# Patient Record
Sex: FEMALE | Race: WHITE | NOT HISPANIC OR LATINO | Employment: FULL TIME | URBAN - METROPOLITAN AREA
[De-identification: names, ages, dates, MRNs, and addresses within clinical notes are randomized per-mention and may not be internally consistent; named-entity substitution may affect disease eponyms.]

---

## 2017-04-28 ENCOUNTER — HOSPITAL ENCOUNTER (EMERGENCY)
Facility: HOSPITAL | Age: 37
Discharge: HOME | End: 2017-04-28
Admitting: EMERGENCY MEDICINE
Payer: COMMERCIAL

## 2017-04-28 ENCOUNTER — APPOINTMENT (EMERGENCY)
Dept: RADIOLOGY | Facility: HOSPITAL | Age: 37
End: 2017-04-28
Payer: COMMERCIAL

## 2017-04-28 VITALS
RESPIRATION RATE: 18 BRPM | SYSTOLIC BLOOD PRESSURE: 146 MMHG | OXYGEN SATURATION: 98 % | HEIGHT: 64 IN | BODY MASS INDEX: 42.34 KG/M2 | DIASTOLIC BLOOD PRESSURE: 83 MMHG | WEIGHT: 248 LBS | HEART RATE: 91 BPM

## 2017-04-28 DIAGNOSIS — S93.401A RIGHT ANKLE SPRAIN: Primary | ICD-10-CM

## 2017-04-28 PROCEDURE — 73610 X-RAY EXAM OF ANKLE: CPT

## 2017-04-28 PROCEDURE — 99283 EMERGENCY DEPT VISIT LOW MDM: CPT

## 2017-04-28 PROCEDURE — 73590 X-RAY EXAM OF LOWER LEG: CPT

## 2017-05-05 ENCOUNTER — ALLSCRIPTS OFFICE VISIT (OUTPATIENT)
Dept: OTHER | Facility: OTHER | Age: 37
End: 2017-05-05

## 2017-05-05 ENCOUNTER — TRANSCRIBE ORDERS (OUTPATIENT)
Dept: LAB | Facility: HOSPITAL | Age: 37
End: 2017-05-05

## 2017-05-05 ENCOUNTER — APPOINTMENT (OUTPATIENT)
Dept: LAB | Facility: HOSPITAL | Age: 37
End: 2017-05-05
Attending: INTERNAL MEDICINE
Payer: COMMERCIAL

## 2017-05-05 DIAGNOSIS — R53.83 FATIGUE, UNSPECIFIED TYPE: Primary | ICD-10-CM

## 2017-05-05 DIAGNOSIS — R53.83 FATIGUE, UNSPECIFIED TYPE: ICD-10-CM

## 2017-05-05 LAB
ALBUMIN SERPL BCP-MCNC: 3.8 G/DL (ref 3.5–5)
ALP SERPL-CCNC: 93 U/L (ref 46–116)
ALT SERPL W P-5'-P-CCNC: 40 U/L (ref 12–78)
ANION GAP SERPL CALCULATED.3IONS-SCNC: 4 MMOL/L (ref 4–13)
AST SERPL W P-5'-P-CCNC: 21 U/L (ref 5–45)
BASOPHILS # BLD AUTO: 0.05 THOUSANDS/ΜL (ref 0–0.1)
BASOPHILS NFR BLD AUTO: 0 % (ref 0–1)
BILIRUB SERPL-MCNC: 0.27 MG/DL (ref 0.2–1)
BUN SERPL-MCNC: 11 MG/DL (ref 5–25)
CALCIUM SERPL-MCNC: 9 MG/DL (ref 8.3–10.1)
CHLORIDE SERPL-SCNC: 104 MMOL/L (ref 100–108)
CHOLEST SERPL-MCNC: 211 MG/DL (ref 50–200)
CO2 SERPL-SCNC: 30 MMOL/L (ref 21–32)
CREAT SERPL-MCNC: 0.81 MG/DL (ref 0.6–1.3)
EOSINOPHIL # BLD AUTO: 0.31 THOUSAND/ΜL (ref 0–0.61)
EOSINOPHIL NFR BLD AUTO: 2 % (ref 0–6)
ERYTHROCYTE [DISTWIDTH] IN BLOOD BY AUTOMATED COUNT: 13.3 % (ref 11.6–15.1)
GFR SERPL CREATININE-BSD FRML MDRD: >60 ML/MIN/1.73SQ M
GLUCOSE P FAST SERPL-MCNC: 135 MG/DL (ref 65–99)
HCT VFR BLD AUTO: 42 % (ref 34.8–46.1)
HDLC SERPL-MCNC: 40 MG/DL (ref 40–60)
HGB BLD-MCNC: 14.8 G/DL (ref 11.5–15.4)
LDLC SERPL CALC-MCNC: 140 MG/DL (ref 0–100)
LYMPHOCYTES # BLD AUTO: 4.86 THOUSANDS/ΜL (ref 0.6–4.47)
LYMPHOCYTES NFR BLD AUTO: 34 % (ref 14–44)
MCH RBC QN AUTO: 32.2 PG (ref 26.8–34.3)
MCHC RBC AUTO-ENTMCNC: 35.2 G/DL (ref 31.4–37.4)
MCV RBC AUTO: 92 FL (ref 82–98)
MONOCYTES # BLD AUTO: 0.8 THOUSAND/ΜL (ref 0.17–1.22)
MONOCYTES NFR BLD AUTO: 6 % (ref 4–12)
NEUTROPHILS # BLD AUTO: 8.29 THOUSANDS/ΜL (ref 1.85–7.62)
NEUTS SEG NFR BLD AUTO: 58 % (ref 43–75)
NRBC BLD AUTO-RTO: 0 /100 WBCS
PLATELET # BLD AUTO: 198 THOUSANDS/UL (ref 149–390)
PMV BLD AUTO: 12.5 FL (ref 8.9–12.7)
POTASSIUM SERPL-SCNC: 4.4 MMOL/L (ref 3.5–5.3)
PROT SERPL-MCNC: 7.2 G/DL (ref 6.4–8.2)
RBC # BLD AUTO: 4.59 MILLION/UL (ref 3.81–5.12)
SODIUM SERPL-SCNC: 138 MMOL/L (ref 136–145)
TRIGL SERPL-MCNC: 155 MG/DL
TSH SERPL DL<=0.05 MIU/L-ACNC: 1.53 UIU/ML (ref 0.36–3.74)
WBC # BLD AUTO: 14.35 THOUSAND/UL (ref 4.31–10.16)

## 2017-05-05 PROCEDURE — 84443 ASSAY THYROID STIM HORMONE: CPT

## 2017-05-05 PROCEDURE — 80053 COMPREHEN METABOLIC PANEL: CPT

## 2017-05-05 PROCEDURE — 85025 COMPLETE CBC W/AUTO DIFF WBC: CPT

## 2017-05-05 PROCEDURE — 36415 COLL VENOUS BLD VENIPUNCTURE: CPT

## 2017-05-05 PROCEDURE — 80061 LIPID PANEL: CPT

## 2017-11-11 ENCOUNTER — HOSPITAL ENCOUNTER (EMERGENCY)
Facility: HOSPITAL | Age: 37
Discharge: HOME | End: 2017-11-11
Attending: EMERGENCY MEDICINE | Admitting: EMERGENCY MEDICINE
Payer: COMMERCIAL

## 2017-11-11 ENCOUNTER — APPOINTMENT (EMERGENCY)
Dept: RADIOLOGY | Facility: HOSPITAL | Age: 37
End: 2017-11-11
Payer: COMMERCIAL

## 2017-11-11 VITALS
RESPIRATION RATE: 18 BRPM | DIASTOLIC BLOOD PRESSURE: 81 MMHG | WEIGHT: 250 LBS | SYSTOLIC BLOOD PRESSURE: 142 MMHG | HEIGHT: 64 IN | TEMPERATURE: 97.7 F | HEART RATE: 74 BPM | OXYGEN SATURATION: 98 % | BODY MASS INDEX: 42.68 KG/M2

## 2017-11-11 DIAGNOSIS — R07.81 PLEURITIC CHEST PAIN: Primary | ICD-10-CM

## 2017-11-11 DIAGNOSIS — R05.9 COUGH: ICD-10-CM

## 2017-11-11 LAB
ALBUMIN SERPL BCP-MCNC: 3.5 G/DL (ref 3.5–5)
ALP SERPL-CCNC: 91 U/L (ref 46–116)
ALT SERPL W P-5'-P-CCNC: 46 U/L (ref 12–78)
ANION GAP SERPL CALCULATED.3IONS-SCNC: 8 MMOL/L (ref 4–13)
AST SERPL W P-5'-P-CCNC: 28 U/L (ref 5–45)
BASOPHILS # BLD AUTO: 0.03 THOUSANDS/ΜL (ref 0–0.1)
BASOPHILS NFR BLD AUTO: 0 % (ref 0–1)
BILIRUB SERPL-MCNC: 0.22 MG/DL (ref 0.2–1)
BILIRUB UR QL STRIP: NEGATIVE
BUN SERPL-MCNC: 10 MG/DL (ref 5–25)
CALCIUM SERPL-MCNC: 9 MG/DL (ref 8.3–10.1)
CHLORIDE SERPL-SCNC: 104 MMOL/L (ref 100–108)
CLARITY UR: CLEAR
CO2 SERPL-SCNC: 25 MMOL/L (ref 21–32)
COLOR UR: YELLOW
CREAT SERPL-MCNC: 0.8 MG/DL (ref 0.6–1.3)
DEPRECATED D DIMER PPP: 266 NG/ML (FEU) (ref 0–424)
EOSINOPHIL # BLD AUTO: 0.29 THOUSAND/ΜL (ref 0–0.61)
EOSINOPHIL NFR BLD AUTO: 2 % (ref 0–6)
ERYTHROCYTE [DISTWIDTH] IN BLOOD BY AUTOMATED COUNT: 13.3 % (ref 11.6–15.1)
EXT PREG TEST URINE: NORMAL
FLUAV AG SPEC QL: NORMAL
FLUBV AG SPEC QL: NORMAL
GFR SERPL CREATININE-BSD FRML MDRD: 95 ML/MIN/1.73SQ M
GLUCOSE SERPL-MCNC: 145 MG/DL (ref 65–140)
GLUCOSE UR STRIP-MCNC: NEGATIVE MG/DL
HCT VFR BLD AUTO: 39.9 % (ref 34.8–46.1)
HGB BLD-MCNC: 14.2 G/DL (ref 11.5–15.4)
HGB UR QL STRIP.AUTO: NEGATIVE
KETONES UR STRIP-MCNC: NEGATIVE MG/DL
LEUKOCYTE ESTERASE UR QL STRIP: NEGATIVE
LIPASE SERPL-CCNC: 153 U/L (ref 73–393)
LYMPHOCYTES # BLD AUTO: 4.67 THOUSANDS/ΜL (ref 0.6–4.47)
LYMPHOCYTES NFR BLD AUTO: 32 % (ref 14–44)
MCH RBC QN AUTO: 32.4 PG (ref 26.8–34.3)
MCHC RBC AUTO-ENTMCNC: 35.6 G/DL (ref 31.4–37.4)
MCV RBC AUTO: 91 FL (ref 82–98)
MONOCYTES # BLD AUTO: 0.6 THOUSAND/ΜL (ref 0.17–1.22)
MONOCYTES NFR BLD AUTO: 4 % (ref 4–12)
NEUTROPHILS # BLD AUTO: 8.9 THOUSANDS/ΜL (ref 1.85–7.62)
NEUTS SEG NFR BLD AUTO: 62 % (ref 43–75)
NITRITE UR QL STRIP: NEGATIVE
NRBC BLD AUTO-RTO: 0 /100 WBCS
PH UR STRIP.AUTO: 6 [PH] (ref 4.5–8)
PLATELET # BLD AUTO: 182 THOUSANDS/UL (ref 149–390)
PMV BLD AUTO: 12.7 FL (ref 8.9–12.7)
POTASSIUM SERPL-SCNC: 3.5 MMOL/L (ref 3.5–5.3)
PROT SERPL-MCNC: 7.1 G/DL (ref 6.4–8.2)
PROT UR STRIP-MCNC: NEGATIVE MG/DL
RBC # BLD AUTO: 4.38 MILLION/UL (ref 3.81–5.12)
RSV B RNA SPEC QL NAA+PROBE: NORMAL
SODIUM SERPL-SCNC: 137 MMOL/L (ref 136–145)
SP GR UR STRIP.AUTO: >=1.03 (ref 1–1.03)
UROBILINOGEN UR QL STRIP.AUTO: 0.2 E.U./DL
WBC # BLD AUTO: 14.53 THOUSAND/UL (ref 4.31–10.16)

## 2017-11-11 PROCEDURE — 87798 DETECT AGENT NOS DNA AMP: CPT | Performed by: EMERGENCY MEDICINE

## 2017-11-11 PROCEDURE — 81025 URINE PREGNANCY TEST: CPT | Performed by: EMERGENCY MEDICINE

## 2017-11-11 PROCEDURE — 93005 ELECTROCARDIOGRAM TRACING: CPT | Performed by: EMERGENCY MEDICINE

## 2017-11-11 PROCEDURE — 85025 COMPLETE CBC W/AUTO DIFF WBC: CPT | Performed by: EMERGENCY MEDICINE

## 2017-11-11 PROCEDURE — 36415 COLL VENOUS BLD VENIPUNCTURE: CPT | Performed by: EMERGENCY MEDICINE

## 2017-11-11 PROCEDURE — 83690 ASSAY OF LIPASE: CPT | Performed by: EMERGENCY MEDICINE

## 2017-11-11 PROCEDURE — 99284 EMERGENCY DEPT VISIT MOD MDM: CPT

## 2017-11-11 PROCEDURE — 85379 FIBRIN DEGRADATION QUANT: CPT | Performed by: EMERGENCY MEDICINE

## 2017-11-11 PROCEDURE — 81003 URINALYSIS AUTO W/O SCOPE: CPT

## 2017-11-11 PROCEDURE — 80053 COMPREHEN METABOLIC PANEL: CPT | Performed by: EMERGENCY MEDICINE

## 2017-11-11 PROCEDURE — 71020 HB CHEST X-RAY 2VW FRONTAL&LATL: CPT

## 2017-11-11 PROCEDURE — 81002 URINALYSIS NONAUTO W/O SCOPE: CPT | Performed by: EMERGENCY MEDICINE

## 2017-11-11 NOTE — DISCHARGE INSTRUCTIONS
Cold Symptoms, Ambulatory Care   GENERAL INFORMATION:   Cold symptoms  include sneezing, dry throat, a stuffy nose, headache, watery eyes, and a cough  Your cough may be dry, or you may cough up mucus  You may also have muscle aches, joint pain, and tiredness  Rarely, you may have a fever  Cold symptoms occur from inflammation in your upper respiratory system caused by a virus  Most colds go away without treatment  Seek immediate care for the following symptoms:   · A heartbeat that is much faster than usual for you     · A swollen neck that is sore to the touch     · Increased tiredness and weakness    · Pinpoint or larger reddish-purple dots on your skin     · Poor or no appetite  Treatment for cold symptoms  may include NSAIDS to decrease muscle aches and fever  Do not give NSAID medicines to children under 10months of age without direction from your child's doctor  Cold medicines may also be given to decrease coughing, nasal stuffiness, sneezing, and a runny nose  Do not give cold medicines to children under 11years of age without direction from your child's doctor  Manage your cold symptoms with the following:   · Drink liquids  to help thin and loosen thick mucus so you can cough it up  Liquids will also keep you hydrated  Ask your healthcare provider which liquids are best for you and how much to drink each day  · Do not smoke  because it may worsen your symptoms and increase the length of time you feel sick  Talk with your healthcare provider if you need help to stop smoking  Prevent the spread of germs  by washing your hands often  You can spread your cold germs to others for at least 3 days after your symptoms start  Do not share items, such as eating utensils  Cover your nose and mouth when you cough or sneeze using the crook of your elbow instead of your hands  Throw used tissues in the garbage    Follow up with your healthcare provider as directed:  Write down your questions so you remember to ask them during your visits  CARE AGREEMENT:   You have the right to help plan your care  Learn about your health condition and how it may be treated  Discuss treatment options with your caregivers to decide what care you want to receive  You always have the right to refuse treatment  The above information is an  only  It is not intended as medical advice for individual conditions or treatments  Talk to your doctor, nurse or pharmacist before following any medical regimen to see if it is safe and effective for you  © 2014 [address] is for End User's use only and may not be sold, redistributed or otherwise used for commercial purposes  All illustrations and images included in CareNotes® are the copyrighted property of A [employer]  or Rahat Orozco

## 2017-11-11 NOTE — ED PROVIDER NOTES
History  Chief Complaint   Patient presents with    Abdominal Pain     Pt c/o lower right quadrant abdominal pain that gets worse with breathing  Pt states she has flu like symptoms for about 1 month  Patient is a 39 yo woman with a pmh of asthma, who presents for evaluation of right lower rib pain  The pain started 1 month ago and has gradually worsened in severity  It is located along her right lower ribs  It is constant today  It is worsened with breathing, coughing and is relieved with rest  She has not taken any medications for the symptoms  She also complains of a cough  The cough has been present for 4 days  She believes that she might have been exposed to sick individuals at work  The cough is non-productive  She also complains of irregular colored stools of the month  Her stools alternate between green, light brown and loose  She denies abdominal pain, cramping, nausea, vomiting  Denies SOB, Ha, fever, chills  She has not had a flu shot this year  Patient smokes 1 pack of cigarettes per day  History provided by:  Patient   used: No        None       Past Medical History:   Diagnosis Date    Asthma     GERD (gastroesophageal reflux disease)     Kidney stones     Kidney stones     Psychiatric disorder     anxiety, depression, PTSD       Past Surgical History:   Procedure Laterality Date    TONSILECTOMY AND ADNOIDECTOMY      TONSILECTOMY AND ADNOIDECTOMY      WISDOM TOOTH EXTRACTION         History reviewed  No pertinent family history  I have reviewed and agree with the history as documented  Social History   Substance Use Topics    Smoking status: Current Every Day Smoker     Packs/day: 1 00    Smokeless tobacco: Never Used    Alcohol use Yes      Comment: social        Review of Systems   Constitutional: Negative for appetite change, chills, diaphoresis, fatigue and fever  HENT: Negative for congestion, sore throat and trouble swallowing      Eyes: Negative for photophobia and visual disturbance  Respiratory: Positive for cough (non-productive)  Negative for choking, chest tightness, shortness of breath and wheezing  Cardiovascular: Positive for chest pain  Negative for palpitations and leg swelling  Gastrointestinal: Positive for diarrhea  Negative for abdominal distention, abdominal pain, constipation, nausea and vomiting  Endocrine: Negative for polydipsia and polyuria  Genitourinary: Negative for difficulty urinating, dysuria, hematuria, vaginal bleeding, vaginal discharge and vaginal pain  Musculoskeletal: Negative for arthralgias, back pain, neck pain and neck stiffness  Skin: Negative for rash and wound  Neurological: Negative for dizziness, light-headedness and headaches  Psychiatric/Behavioral: Negative for behavioral problems  The patient is not nervous/anxious  Physical Exam  ED Triage Vitals   Temperature Pulse Respirations Blood Pressure SpO2   11/10/17 2349 11/10/17 2347 11/10/17 2347 11/10/17 2347 11/10/17 2347   97 7 °F (36 5 °C) 82 18 154/94 100 %      Temp Source Heart Rate Source Patient Position - Orthostatic VS BP Location FiO2 (%)   11/10/17 2349 11/11/17 0107 11/11/17 0107 11/11/17 0107 --   Oral Monitor Lying Right arm       Pain Score       11/10/17 2347       5           Orthostatic Vital Signs  Vitals:    11/10/17 2347 11/11/17 0107   BP: 154/94 142/81   Pulse: 82 74   Patient Position - Orthostatic VS:  Lying       Physical Exam   Constitutional: She is oriented to person, place, and time  She appears well-developed and well-nourished  No distress  HENT:   Head: Normocephalic and atraumatic  Eyes: Conjunctivae are normal  Pupils are equal, round, and reactive to light  No scleral icterus  Neck: Normal range of motion  Neck supple  Cardiovascular: Normal rate, regular rhythm and normal heart sounds  Exam reveals no friction rub  No murmur heard    Pulmonary/Chest: Effort normal and breath sounds normal  No respiratory distress  She has no wheezes  She exhibits tenderness (pain with deep inspiration  )  Abdominal: Soft  Bowel sounds are normal  She exhibits no distension and no mass  There is no tenderness  There is no rebound and no guarding  Musculoskeletal: Normal range of motion  She exhibits no edema  Neurological: She is alert and oriented to person, place, and time  Skin: Skin is warm and dry  No rash noted  She is not diaphoretic  No erythema  No pallor  Psychiatric: She has a normal mood and affect  Her behavior is normal    Nursing note and vitals reviewed  ED Medications  Medications - No data to display    Diagnostic Studies  Results Reviewed     Procedure Component Value Units Date/Time    Influenza A/B and RSV by PCR (indicated for patients >2 mo of age) [[id number]]  (Normal) Collected:  11/11/17 0100    Lab Status:  Final result Specimen:  Nasopharyngeal from Nasopharyngeal Swab Updated:  11/11/17 1334     INFLU A PCR None Detected     INFLU B PCR None Detected     RSV PCR None Detected    Comprehensive metabolic panel [[id number]]  (Abnormal) Collected:  11/11/17 0054    Lab Status:  Final result Specimen:  Blood from Arm, Right Updated:  11/11/17 0122     Sodium 137 mmol/L      Potassium 3 5 mmol/L      Chloride 104 mmol/L      CO2 25 mmol/L      Anion Gap 8 mmol/L      BUN 10 mg/dL      Creatinine 0 80 mg/dL      Glucose 145 (H) mg/dL      Calcium 9 0 mg/dL      AST 28 U/L      ALT 46 U/L      Alkaline Phosphatase 91 U/L      Total Protein 7 1 g/dL      Albumin 3 5 g/dL      Total Bilirubin 0 22 mg/dL      eGFR 95 ml/min/1 73sq m     Narrative:         National Kidney Disease Education Program recommendations are as follows:  GFR calculation is accurate only with a steady state creatinine  Chronic Kidney disease less than 60 ml/min/1 73 sq  meters  Kidney failure less than 15 ml/min/1 73 sq  meters      Lipase [[id number]]  (Normal) Collected:  11/11/17 0054    Lab Status:  Final result Specimen:  Blood from Arm, Right Updated:  11/11/17 0122     Lipase 153 u/L     D-Dimer [[id number]]  (Normal) Collected:  11/11/17 0054    Lab Status:  Final result Specimen:  Blood from Arm, Right Updated:  11/11/17 0116     D-Dimer, Quant 266 ng/ml (FEU)     POCT pregnancy, urine [[id number]]  (Normal) Resulted:  11/11/17 0106    Lab Status:  Final result Updated:  11/11/17 0106     EXT PREG TEST UR (Ref: Negative) hcg neg    POCT urinalysis dipstick [[id number]]  (Normal) Resulted:  11/11/17 0105    Lab Status:  Final result Specimen:  Urine Updated:  11/11/17 0106    ED Urine Macroscopic [[id number]]  (Normal) Collected:  11/11/17 0212    Lab Status:  Final result Specimen:  Urine Updated:  11/11/17 0105     Color, UA Yellow     Clarity, UA Clear     pH, UA 6 0     Leukocytes, UA Negative     Nitrite, UA Negative     Protein, UA Negative mg/dl      Glucose, UA Negative mg/dl      Ketones, UA Negative mg/dl      Urobilinogen, UA 0 2 E U /dl      Bilirubin, UA Negative     Blood, UA Negative     Specific Gravity, UA >=1 030    Narrative:       CLINITEK RESULT    CBC and differential [[id number]]  (Abnormal) Collected:  11/11/17 0054    Lab Status:  Final result Specimen:  Blood from Arm, Right Updated:  11/11/17 0102     WBC 14 53 (H) Thousand/uL      RBC 4 38 Million/uL      Hemoglobin 14 2 g/dL      Hematocrit 39 9 %      MCV 91 fL      MCH 32 4 pg      MCHC 35 6 g/dL      RDW 13 3 %      MPV 12 7 fL      Platelets 652 Thousands/uL      nRBC 0 /100 WBCs      Neutrophils Relative 62 %      Lymphocytes Relative 32 %      Monocytes Relative 4 %      Eosinophils Relative 2 %      Basophils Relative 0 %      Neutrophils Absolute 8 90 (H) Thousands/µL      Lymphocytes Absolute 4 67 (H) Thousands/µL      Monocytes Absolute 0 60 Thousand/µL      Eosinophils Absolute 0 29 Thousand/µL      Basophils Absolute 0 03 Thousands/µL                  XR chest 2 views   Final Result by Dima Smith MD (11/11 1888)      No active pulmonary disease  Workstation performed: [id number]               Procedures  ECG 12 Lead Documentation  Date/Time: 11/11/2017 1:31 AM  Performed by: Jaya Sher  Authorized by: Vern Dietrich     Indications / Diagnosis:  Pleuritic chest pain  ECG reviewed by me, the ED Provider: yes    Patient location:  ED  Previous ECG:     Previous ECG:  Unavailable    Comparison to cardiac monitor: Yes    Interpretation:     Interpretation: non-specific    Rate:     ECG rate:  70    ECG rate assessment: normal    Rhythm:     Rhythm: sinus rhythm    Ectopy:     Ectopy: none    QRS:     QRS axis:  Normal  Conduction:     Conduction: normal    ST segments:     ST segments:  Normal  T waves:     T waves: normal    Comments:      Possible right guillermo branch block            Phone Consults  ED Phone Contact    ED Course  ED Course as of Nov 12 1743   Sat Nov 11, 2017   0134 D-DIMER QUANTITATIVE: 266   0135 WBC: (!) 14 53                         Wells' Criteria for PE    Flowsheet Row Most Recent Value   Wells' Criteria for PE   Clinical signs and symptoms of DVT  0 Filed at: 11/11/2017 0753   PE is primary diagnosis or equally likely  0 Filed at: 11/11/2017 0049   HR >100  0 Filed at: 11/11/2017 0049   Immobilization at least 3 days or Surgery in the previous 4 weeks  0 Filed at: 11/11/2017 0049   Previous, objectively diagnosed PE or DVT  0 Filed at: 11/11/2017 0049   Hemoptysis  0 Filed at: 11/11/2017 0049   Malignancy with treatment within 6 months or palliative  0 Filed at: 11/11/2017 0049   Wells' Criteria Total  0 Filed at: 11/11/2017 0049            MDM  Number of Diagnoses or Management Options  Cough: new and requires workup  Pleuritic chest pain: new and requires workup  Diagnosis management comments: 41 yo woman with cough and pleuritic chest pain  Ddx includes Pulmonary embolism, effusion, pleuritis, pneumonia  CXR normal, D-dimmer 266, CMP, CBC, UA, EKG all wnl  Flu swab obtained   Patient will be called if results positive  Presentation likely musculoskeletal irritation from chronic cough 2/2 smoking  Patient was counseled on pain control at home with motrin and tylenol alternating  Patient also counseled on the risk of smoking and benefits of cessation  Return precautions discussed  Amount and/or Complexity of Data Reviewed  Clinical lab tests: ordered and reviewed  Tests in the radiology section of CPT®: ordered and reviewed  Tests in the medicine section of CPT®: ordered and reviewed  Decide to obtain previous medical records or to obtain history from someone other than the patient: yes  Obtain history from someone other than the patient: yes  Review and summarize past medical records: yes  Discuss the patient with other providers: yes  Independent visualization of images, tracings, or specimens: yes    Risk of Complications, Morbidity, and/or Mortality  Presenting problems: high  Diagnostic procedures: low  Management options: minimal      CritCare Time    Disposition  Final diagnoses:   Pleuritic chest pain   Cough     Time reflects when diagnosis was documented in both MDM as applicable and the Disposition within this note     Time User Action Codes Description Comment    11/11/2017  1:44 AM Laura Penrose Add [R07 81] Pleuritic chest pain     11/11/2017  1:44 AM Laura Penrose Add [R05] Cough       ED Disposition     ED Disposition Condition Comment    Discharge  Dena Garcia discharge to home/self care  Condition at discharge: Stable        Follow-up Information     Follow up With Specialties Details Why Contact Info Additional [address] Emergency Department Emergency Medicine Go to As needed, If symptoms worsen [address] [place] [address], [address] [place] [address], [id number]        There are no discharge medications for this patient  No discharge procedures on file      ED Provider  Attending physically available and evaluated Refugio Garcia I managed the patient along with the ED Attending      Electronically Signed by         Dalia Roberts MD  Resident  11/12/17 6969

## 2017-11-11 NOTE — ED ATTENDING ATTESTATION
Prabhu Lund MD, saw and evaluated the patient  I have discussed the patient with the resident/non-physician practitioner and agree with the resident's/non-physician practitioner's findings, Plan of Care, and MDM as documented in the resident's/non-physician practitioner's note, except where noted  All available labs and Radiology studies were reviewed  At this point I agree with the current assessment done in the Emergency Department  I have conducted an independent evaluation of this patient including a focused history of:    Emergency Department Note- Vee Maloney 40 y o  female MRN: [id number]    Unit/Bed#: ED 18 Encounter: [id number]    Vee Maloney is a 40 y o  female who presents with   Chief Complaint   Patient presents with    Abdominal Pain     Pt c/o lower right quadrant abdominal pain that gets worse with breathing  Pt states she has flu like symptoms for about 1 month  History of Present Illness   HPI:  Vee Maloney is a 40 y o  female who presents for evaluation of:  Right sided pleuritic pain  Pain has been intermittent for days  Patient has also had intermittent nausea  Patient has no h/o VTE       Review of Systems    Historical Information   Past Medical History:   Diagnosis Date    Asthma     GERD (gastroesophageal reflux disease)     Kidney stones     Kidney stones     Psychiatric disorder     anxiety, depression, PTSD     Past Surgical History:   Procedure Laterality Date    TONSILECTOMY AND ADNOIDECTOMY      TONSILECTOMY AND ADNOIDECTOMY      WISDOM TOOTH EXTRACTION       Social History   History   Alcohol Use    Yes     Comment: social     History   Drug Use No     History   Smoking Status    Current Every Day Smoker    Packs/day: 1 00   Smokeless Tobacco    Never Used     Family History: non-contributory    Meds/Allergies   all medications and allergies reviewed  Allergies   Allergen Reactions    Bee Venom Anaphylaxis    Penicillins Hives       Objective   First Vitals:   Blood Pressure: 154/94 (11/10/17 2347)  Pulse: 82 (11/10/17 2347)  Temperature: 97 7 °F (36 5 °C) (11/10/17 2349)  Temp Source: Oral (11/10/17 2349)  Respirations: 18 (11/10/17 2347)  Height: 5' 4" (162 6 cm) (11/10/17 2347)  Weight - Scale: 113 kg (250 lb) (11/10/17 2347)  SpO2: 100 % (11/10/17 2347)    Current Vitals:   Blood Pressure: 142/81 (17)  Pulse: 74 (17)  Temperature: 97 7 °F (36 5 °C) (11/10/17 2349)  Temp Source: Oral (11/10/17 2349)  Respirations: 18 (17)  Height: 5' 4" (162 6 cm) (11/10/17 2347)  Weight - Scale: 113 kg (250 lb) (11/10/17 2347)  SpO2: 98 % (17)    No intake or output data in the 24 hours ending 17 1447    Invasive Devices     Peripheral Intravenous Line            Peripheral IV 17 Right Antecubital less than 1 day                Physical Exam   Constitutional: She is oriented to person, place, and time  She appears well-developed and well-nourished  HENT:   Head: Normocephalic and atraumatic  Eyes: Conjunctivae are normal  Pupils are equal, round, and reactive to light  Neck: Normal range of motion  Neck supple  Cardiovascular: Normal rate and regular rhythm  Pulmonary/Chest: Effort normal and breath sounds normal    Neurological: She is alert and oriented to person, place, and time  Skin: Skin is warm and dry  Psychiatric: She has a normal mood and affect  Her behavior is normal  Judgment and thought content normal    Nursing note and vitals reviewed  Medical Decision Makin  Acute right sided pleuritic pain:  Plan to obtain D-dimer to rule out pulmonary embolism; chest x-ray to rule out pneumonia and pneumothorax  ECG to rule out pericarditis      Recent Results (from the past 36 hour(s))   Comprehensive metabolic panel    Collection Time: 17 12:54 AM   Result Value Ref Range    Sodium 137 136 - 145 mmol/L    Potassium 3 5 3 5 - 5 3 mmol/L    Chloride 104 100 - 108 mmol/L    CO2 25 21 - 32 mmol/L    Anion Gap 8 4 - 13 mmol/L    BUN 10 5 - 25 mg/dL    Creatinine 0 80 0 60 - 1 30 mg/dL    Glucose 145 (H) 65 - 140 mg/dL    Calcium 9 0 8 3 - 10 1 mg/dL    AST 28 5 - 45 U/L    ALT 46 12 - 78 U/L    Alkaline Phosphatase 91 46 - 116 U/L    Total Protein 7 1 6 4 - 8 2 g/dL    Albumin 3 5 3 5 - 5 0 g/dL    Total Bilirubin 0 22 0 20 - 1 00 mg/dL    eGFR 95 ml/min/1 73sq m   CBC and differential    Collection Time: 11/11/17 12:54 AM   Result Value Ref Range    WBC 14 53 (H) 4 31 - 10 16 Thousand/uL    RBC 4 38 3 81 - 5 12 Million/uL    Hemoglobin 14 2 11 5 - 15 4 g/dL    Hematocrit 39 9 34 8 - 46 1 %    MCV 91 82 - 98 fL    MCH 32 4 26 8 - 34 3 pg    MCHC 35 6 31 4 - 37 4 g/dL    RDW 13 3 11 6 - 15 1 %    MPV 12 7 8 9 - 12 7 fL    Platelets 041 996 - 398 Thousands/uL    nRBC 0 /100 WBCs    Neutrophils Relative 62 43 - 75 %    Lymphocytes Relative 32 14 - 44 %    Monocytes Relative 4 4 - 12 %    Eosinophils Relative 2 0 - 6 %    Basophils Relative 0 0 - 1 %    Neutrophils Absolute 8 90 (H) 1 85 - 7 62 Thousands/µL    Lymphocytes Absolute 4 67 (H) 0 60 - 4 47 Thousands/µL    Monocytes Absolute 0 60 0 17 - 1 22 Thousand/µL    Eosinophils Absolute 0 29 0 00 - 0 61 Thousand/µL    Basophils Absolute 0 03 0 00 - 0 10 Thousands/µL   Lipase    Collection Time: 11/11/17 12:54 AM   Result Value Ref Range    Lipase 153 73 - 393 u/L   D-Dimer    Collection Time: 11/11/17 12:54 AM   Result Value Ref Range    D-Dimer, Quant 266 0 - 424 ng/ml (FEU)   Influenza A/B and RSV by PCR (indicated for patients >2 mo of age)    Collection Time: 11/11/17  1:00 AM   Result Value Ref Range    INFLU A PCR None Detected None Detected    INFLU B PCR None Detected None Detected    RSV PCR None Detected None Detected   POCT pregnancy, urine    Collection Time: 11/11/17  1:06 AM   Result Value Ref Range    EXT PREG TEST UR (Ref: Negative) hcg neg    ED Urine Macroscopic    Collection Time: 11/11/17  2:12 AM Result Value Ref Range    Color, UA Yellow     Clarity, UA Clear     pH, UA 6 0 4 5 - 8 0    Leukocytes, UA Negative Negative    Nitrite, UA Negative Negative    Protein, UA Negative Negative mg/dl    Glucose, UA Negative Negative mg/dl    Ketones, UA Negative Negative mg/dl    Urobilinogen, UA 0 2 0 2, 1 0 E U /dl E U /dl    Bilirubin, UA Negative Negative    Blood, UA Negative Negative    Specific Gravity, UA >=1 030 1 003 - 1 030     XR chest 2 views   Final Result      No active pulmonary disease  Workstation performed: [id number]               Portions of the record may have been created with voice recognition software  Occasional wrong word or "sound a like" substitutions may have occurred due to the inherent limitations of voice recognition software  Read the chart carefully and recognize, using context, where substitutions have occurred

## 2017-11-13 LAB
ATRIAL RATE: 70 BPM
P AXIS: 26 DEGREES
PR INTERVAL: 124 MS
QRS AXIS: 42 DEGREES
QRSD INTERVAL: 96 MS
QT INTERVAL: 404 MS
QTC INTERVAL: 436 MS
T WAVE AXIS: 14 DEGREES
VENTRICULAR RATE: 70 BPM

## 2018-01-05 ENCOUNTER — TRANSCRIBE ORDERS (OUTPATIENT)
Dept: LAB | Facility: HOSPITAL | Age: 38
End: 2018-01-05

## 2018-01-05 ENCOUNTER — APPOINTMENT (OUTPATIENT)
Dept: LAB | Facility: HOSPITAL | Age: 38
End: 2018-01-05
Payer: COMMERCIAL

## 2018-01-05 DIAGNOSIS — N97.9 PRIMARY FEMALE INFERTILITY: ICD-10-CM

## 2018-01-05 DIAGNOSIS — N97.9 PRIMARY FEMALE INFERTILITY: Primary | ICD-10-CM

## 2018-01-05 LAB
ESTRADIOL SERPL-MCNC: 23 PG/ML
FSH SERPL-ACNC: 4 MIU/ML
LH SERPL-ACNC: 2.9 MIU/ML
TSH SERPL DL<=0.05 MIU/L-ACNC: 1.11 UIU/ML (ref 0.36–3.74)

## 2018-01-05 PROCEDURE — 36415 COLL VENOUS BLD VENIPUNCTURE: CPT

## 2018-01-05 PROCEDURE — 84443 ASSAY THYROID STIM HORMONE: CPT

## 2018-01-05 PROCEDURE — 83001 ASSAY OF GONADOTROPIN (FSH): CPT

## 2018-01-05 PROCEDURE — 82670 ASSAY OF TOTAL ESTRADIOL: CPT

## 2018-01-05 PROCEDURE — 83002 ASSAY OF GONADOTROPIN (LH): CPT

## 2018-01-05 PROCEDURE — 83516 IMMUNOASSAY NONANTIBODY: CPT | Performed by: OBSTETRICS & GYNECOLOGY

## 2018-01-09 LAB — MIS SERPL-MCNC: 1.43 NG/ML

## 2018-01-12 VITALS
HEART RATE: 86 BPM | HEIGHT: 64 IN | SYSTOLIC BLOOD PRESSURE: 126 MMHG | BODY MASS INDEX: 43.45 KG/M2 | DIASTOLIC BLOOD PRESSURE: 86 MMHG | WEIGHT: 254.5 LBS

## 2018-01-30 ENCOUNTER — APPOINTMENT (OUTPATIENT)
Dept: LAB | Facility: HOSPITAL | Age: 38
End: 2018-01-30
Payer: COMMERCIAL

## 2018-01-30 ENCOUNTER — TRANSCRIBE ORDERS (OUTPATIENT)
Dept: LAB | Facility: HOSPITAL | Age: 38
End: 2018-01-30

## 2018-01-30 DIAGNOSIS — Z13.29 SCREENING FOR THYROID DISORDER: ICD-10-CM

## 2018-01-30 DIAGNOSIS — N97.9 PRIMARY FEMALE INFERTILITY: Primary | ICD-10-CM

## 2018-01-30 LAB
ESTRADIOL SERPL-MCNC: 27 PG/ML
FSH SERPL-ACNC: 4.4 MIU/ML
LH SERPL-ACNC: 3 MIU/ML
PROGEST SERPL-MCNC: 0.6 NG/ML

## 2018-01-30 PROCEDURE — 83001 ASSAY OF GONADOTROPIN (FSH): CPT

## 2018-01-30 PROCEDURE — 36415 COLL VENOUS BLD VENIPUNCTURE: CPT

## 2018-01-30 PROCEDURE — 82670 ASSAY OF TOTAL ESTRADIOL: CPT

## 2018-01-30 PROCEDURE — 83002 ASSAY OF GONADOTROPIN (LH): CPT

## 2018-01-30 PROCEDURE — 84144 ASSAY OF PROGESTERONE: CPT

## 2018-02-17 ENCOUNTER — APPOINTMENT (OUTPATIENT)
Dept: LAB | Facility: HOSPITAL | Age: 38
End: 2018-02-17
Payer: COMMERCIAL

## 2018-02-17 DIAGNOSIS — N97.9 PRIMARY FEMALE INFERTILITY: ICD-10-CM

## 2018-02-17 DIAGNOSIS — Z13.29 SCREENING FOR THYROID DISORDER: ICD-10-CM

## 2018-02-17 LAB
ESTRADIOL SERPL-MCNC: 159 PG/ML
PROGEST SERPL-MCNC: 0.9 NG/ML
TSH SERPL DL<=0.05 MIU/L-ACNC: 1.71 UIU/ML (ref 0.36–3.74)

## 2018-02-17 PROCEDURE — 84443 ASSAY THYROID STIM HORMONE: CPT

## 2018-02-17 PROCEDURE — 82670 ASSAY OF TOTAL ESTRADIOL: CPT

## 2018-02-17 PROCEDURE — 84144 ASSAY OF PROGESTERONE: CPT

## 2018-02-17 PROCEDURE — 36415 COLL VENOUS BLD VENIPUNCTURE: CPT

## 2018-02-17 PROCEDURE — 83516 IMMUNOASSAY NONANTIBODY: CPT

## 2018-02-21 LAB — MIS SERPL-MCNC: 1.56 NG/ML

## 2020-11-12 ENCOUNTER — TELEPHONE (OUTPATIENT)
Dept: BARIATRICS | Facility: CLINIC | Age: 40
End: 2020-11-12

## 2020-11-17 ENCOUNTER — CLINICAL SUPPORT (OUTPATIENT)
Dept: BARIATRICS | Facility: CLINIC | Age: 40
End: 2020-11-17

## 2020-11-17 VITALS
WEIGHT: 233 LBS | SYSTOLIC BLOOD PRESSURE: 118 MMHG | TEMPERATURE: 97.5 F | DIASTOLIC BLOOD PRESSURE: 70 MMHG | HEIGHT: 65 IN | HEART RATE: 71 BPM | BODY MASS INDEX: 38.82 KG/M2

## 2020-11-17 DIAGNOSIS — E66.9 OBESITY, UNSPECIFIED CLASSIFICATION, UNSPECIFIED OBESITY TYPE, UNSPECIFIED WHETHER SERIOUS COMORBIDITY PRESENT: Primary | ICD-10-CM

## 2020-11-17 DIAGNOSIS — E11.69 DIABETES MELLITUS TYPE 2 IN OBESE (HCC): ICD-10-CM

## 2020-11-17 DIAGNOSIS — E66.9 OBESITY, CLASS II, BMI 35-39.9: Primary | ICD-10-CM

## 2020-11-17 DIAGNOSIS — E66.9 DIABETES MELLITUS TYPE 2 IN OBESE (HCC): ICD-10-CM

## 2020-11-17 PROCEDURE — RECHECK

## 2020-11-17 RX ORDER — SITAGLIPTIN AND METFORMIN HYDROCHLORIDE 1000; 50 MG/1; MG/1
TABLET, FILM COATED ORAL
COMMUNITY
Start: 2020-11-14

## 2020-11-18 ENCOUNTER — LAB (OUTPATIENT)
Dept: LAB | Facility: HOSPITAL | Age: 40
End: 2020-11-18
Payer: COMMERCIAL

## 2020-11-18 DIAGNOSIS — E11.649 UNCONTROLLED TYPE 2 DIABETES MELLITUS WITH HYPOGLYCEMIA WITHOUT COMA (HCC): Primary | ICD-10-CM

## 2020-11-18 DIAGNOSIS — I10 ESSENTIAL HYPERTENSION, MALIGNANT: ICD-10-CM

## 2020-11-18 LAB
ALBUMIN SERPL BCP-MCNC: 3.7 G/DL (ref 3.5–5)
ALP SERPL-CCNC: 103 U/L (ref 46–116)
ALT SERPL W P-5'-P-CCNC: 34 U/L (ref 12–78)
ANION GAP SERPL CALCULATED.3IONS-SCNC: 8 MMOL/L (ref 4–13)
AST SERPL W P-5'-P-CCNC: 18 U/L (ref 5–45)
BASOPHILS # BLD AUTO: 0.05 THOUSANDS/ΜL (ref 0–0.1)
BASOPHILS NFR BLD AUTO: 0 % (ref 0–1)
BILIRUB SERPL-MCNC: 0.31 MG/DL (ref 0.2–1)
BUN SERPL-MCNC: 13 MG/DL (ref 5–25)
CALCIUM SERPL-MCNC: 10.1 MG/DL (ref 8.3–10.1)
CHLORIDE SERPL-SCNC: 106 MMOL/L (ref 100–108)
CHOLEST SERPL-MCNC: 219 MG/DL (ref 50–200)
CO2 SERPL-SCNC: 26 MMOL/L (ref 21–32)
CREAT SERPL-MCNC: 0.72 MG/DL (ref 0.6–1.3)
EOSINOPHIL # BLD AUTO: 0.21 THOUSAND/ΜL (ref 0–0.61)
EOSINOPHIL NFR BLD AUTO: 2 % (ref 0–6)
ERYTHROCYTE [DISTWIDTH] IN BLOOD BY AUTOMATED COUNT: 12.6 % (ref 11.6–15.1)
GFR SERPL CREATININE-BSD FRML MDRD: 106 ML/MIN/1.73SQ M
GLUCOSE P FAST SERPL-MCNC: 255 MG/DL (ref 65–99)
HCT VFR BLD AUTO: 44.8 % (ref 34.8–46.1)
HDLC SERPL-MCNC: 37 MG/DL
HGB BLD-MCNC: 14.7 G/DL (ref 11.5–15.4)
IMM GRANULOCYTES # BLD AUTO: 0.06 THOUSAND/UL (ref 0–0.2)
IMM GRANULOCYTES NFR BLD AUTO: 1 % (ref 0–2)
LDLC SERPL CALC-MCNC: 140 MG/DL (ref 0–100)
LYMPHOCYTES # BLD AUTO: 3.28 THOUSANDS/ΜL (ref 0.6–4.47)
LYMPHOCYTES NFR BLD AUTO: 27 % (ref 14–44)
MCH RBC QN AUTO: 30.2 PG (ref 26.8–34.3)
MCHC RBC AUTO-ENTMCNC: 32.8 G/DL (ref 31.4–37.4)
MCV RBC AUTO: 92 FL (ref 82–98)
MONOCYTES # BLD AUTO: 0.54 THOUSAND/ΜL (ref 0.17–1.22)
MONOCYTES NFR BLD AUTO: 4 % (ref 4–12)
NEUTROPHILS # BLD AUTO: 8.06 THOUSANDS/ΜL (ref 1.85–7.62)
NEUTS SEG NFR BLD AUTO: 66 % (ref 43–75)
NONHDLC SERPL-MCNC: 182 MG/DL
NRBC BLD AUTO-RTO: 0 /100 WBCS
PLATELET # BLD AUTO: 164 THOUSANDS/UL (ref 149–390)
PMV BLD AUTO: 13.5 FL (ref 8.9–12.7)
POTASSIUM SERPL-SCNC: 4.4 MMOL/L (ref 3.5–5.3)
PROT SERPL-MCNC: 7 G/DL (ref 6.4–8.2)
RBC # BLD AUTO: 4.86 MILLION/UL (ref 3.81–5.12)
SODIUM SERPL-SCNC: 140 MMOL/L (ref 136–145)
T4 FREE SERPL-MCNC: 1.19 NG/DL (ref 0.76–1.46)
TRIGL SERPL-MCNC: 212 MG/DL
TSH SERPL DL<=0.05 MIU/L-ACNC: 1.44 UIU/ML (ref 0.36–3.74)
WBC # BLD AUTO: 12.2 THOUSAND/UL (ref 4.31–10.16)

## 2020-11-18 PROCEDURE — 84439 ASSAY OF FREE THYROXINE: CPT

## 2020-11-18 PROCEDURE — 85025 COMPLETE CBC W/AUTO DIFF WBC: CPT

## 2020-11-18 PROCEDURE — 80053 COMPREHEN METABOLIC PANEL: CPT

## 2020-11-18 PROCEDURE — 80061 LIPID PANEL: CPT

## 2020-11-18 PROCEDURE — 84443 ASSAY THYROID STIM HORMONE: CPT

## 2020-11-18 PROCEDURE — 36415 COLL VENOUS BLD VENIPUNCTURE: CPT

## 2020-11-20 ENCOUNTER — HOSPITAL ENCOUNTER (EMERGENCY)
Facility: HOSPITAL | Age: 40
Discharge: HOME | End: 2020-11-21
Attending: EMERGENCY MEDICINE | Admitting: EMERGENCY MEDICINE
Payer: COMMERCIAL

## 2020-11-20 VITALS
WEIGHT: 233 LBS | DIASTOLIC BLOOD PRESSURE: 99 MMHG | HEIGHT: 63 IN | SYSTOLIC BLOOD PRESSURE: 140 MMHG | BODY MASS INDEX: 41.29 KG/M2 | OXYGEN SATURATION: 99 % | HEART RATE: 94 BPM | TEMPERATURE: 98 F | RESPIRATION RATE: 20 BRPM
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
## 2020-11-20 PROCEDURE — 99284 EMERGENCY DEPT VISIT MOD MDM: CPT

## 2020-11-20 PROCEDURE — 99284 EMERGENCY DEPT VISIT MOD MDM: CPT | Performed by: EMERGENCY MEDICINE

## 2020-11-21 ENCOUNTER — APPOINTMENT (EMERGENCY)
Dept: RADIOLOGY | Facility: HOSPITAL | Age: 40
End: 2020-11-21
Payer: COMMERCIAL

## 2020-11-21 PROCEDURE — G1004 CDSM NDSC: HCPCS

## 2020-11-21 PROCEDURE — 70450 CT HEAD/BRAIN W/O DYE: CPT

## 2020-12-23 ENCOUNTER — NURSE TRIAGE (OUTPATIENT)
Dept: OTHER | Facility: OTHER | Age: 40
End: 2020-12-23

## 2020-12-23 DIAGNOSIS — Z20.828 EXPOSURE TO SARS-ASSOCIATED CORONAVIRUS: Primary | ICD-10-CM

## 2020-12-23 DIAGNOSIS — Z20.828 EXPOSURE TO SARS-ASSOCIATED CORONAVIRUS: ICD-10-CM

## 2020-12-23 PROCEDURE — U0003 INFECTIOUS AGENT DETECTION BY NUCLEIC ACID (DNA OR RNA); SEVERE ACUTE RESPIRATORY SYNDROME CORONAVIRUS 2 (SARS-COV-2) (CORONAVIRUS DISEASE [COVID-19]), AMPLIFIED PROBE TECHNIQUE, MAKING USE OF HIGH THROUGHPUT TECHNOLOGIES AS DESCRIBED BY CMS-2020-01-R: HCPCS | Performed by: FAMILY MEDICINE

## 2020-12-24 LAB — SARS-COV-2 RNA SPEC QL NAA+PROBE: NOT DETECTED

## 2021-01-25 ENCOUNTER — OFFICE VISIT (OUTPATIENT)
Dept: CARDIOLOGY CLINIC | Facility: CLINIC | Age: 41
End: 2021-01-25
Payer: COMMERCIAL

## 2021-01-25 VITALS
WEIGHT: 235 LBS | SYSTOLIC BLOOD PRESSURE: 122 MMHG | DIASTOLIC BLOOD PRESSURE: 80 MMHG | BODY MASS INDEX: 41.64 KG/M2 | OXYGEN SATURATION: 98 % | HEIGHT: 63 IN | HEART RATE: 97 BPM

## 2021-01-25 DIAGNOSIS — E66.9 OBESITY, UNSPECIFIED CLASSIFICATION, UNSPECIFIED OBESITY TYPE, UNSPECIFIED WHETHER SERIOUS COMORBIDITY PRESENT: ICD-10-CM

## 2021-01-25 DIAGNOSIS — E66.9 OBESITY, CLASS II, BMI 35-39.9: ICD-10-CM

## 2021-01-25 DIAGNOSIS — Z01.818 PRE-OP EVALUATION: Primary | ICD-10-CM

## 2021-01-25 DIAGNOSIS — Z01.810 PRE-OPERATIVE CARDIOVASCULAR EXAMINATION: ICD-10-CM

## 2021-01-25 PROCEDURE — 93000 ELECTROCARDIOGRAM COMPLETE: CPT | Performed by: INTERNAL MEDICINE

## 2021-01-25 PROCEDURE — 99204 OFFICE O/P NEW MOD 45 MIN: CPT | Performed by: INTERNAL MEDICINE

## 2021-01-25 RX ORDER — FAMOTIDINE 20 MG/1
20 TABLET, FILM COATED ORAL 2 TIMES DAILY
COMMUNITY
Start: 2021-01-21

## 2021-01-25 RX ORDER — MEDROXYPROGESTERONE ACETATE 150 MG/ML
150 INJECTION, SUSPENSION INTRAMUSCULAR
COMMUNITY
Start: 2021-01-21

## 2021-01-25 RX ORDER — CETIRIZINE HYDROCHLORIDE 10 MG/1
10 TABLET ORAL DAILY
COMMUNITY
Start: 2021-01-21

## 2021-01-25 RX ORDER — LISINOPRIL 10 MG/1
10 TABLET ORAL DAILY
COMMUNITY
Start: 2020-12-16

## 2021-01-25 NOTE — LETTER
January 25, 2021     Referral [address] [place] [id number]    Patient: Edgar Andrade   YOB: 1980   Date of Visit: 1/25/2021       Dear Dr Maribell Palmer:    Thank you for referring Vaibhav Neumann to me for evaluation  Below are my notes for this consultation  If you have questions, please do not hesitate to call me  I look forward to following your patient along with you  Sincerely,        Isabel Katz MD        CC: MD Isabel Good MD  1/25/2021  4:17 PM  Incomplete                                             Cardiology Consultation     Edgar Andrade  [id number]  1980  HEART & VASCULAR [facility] [address] [address]    1  Pre-op evaluation  POCT ECG   2  Obesity, unspecified classification, unspecified obesity type, unspecified whether serious comorbidity present  Ambulatory referral to Cardiology   3  Pre-operative cardiovascular examination     4  Obesity, Class II, BMI 35-39 9       Chief Complaint   Patient presents with    Pre-op Exam     bariactric surgery     HPI: Patient is here for cardiac evaluation prior to bariatric surgery  Patient feels well, without complaints  No reported chest pain, shortness of breath, palpitations, lightheadedness, syncope, LE edema, orthopnea, PND, or significant weight changes  Patient remains active without any increased fatigue out of the ordinary        Patient Active Problem List   Diagnosis    Obesity, Class II, BMI 35-39 9    Diabetes mellitus type 2 in obese [facility])    Pre-operative cardiovascular examination     Past Medical History:   Diagnosis Date    Asthma     Diabetes mellitus (HCC)     GERD (gastroesophageal reflux disease)     IBS (irritable bowel syndrome)     Kidney stones     Kidney stones     Psychiatric disorder     anxiety, depression, PTSD    PTSD (post-traumatic stress disorder)      Social History Socioeconomic History    Marital status: /Civil Union     Spouse name: Not on file    Number of children: Not on file    Years of education: Not on file    Highest education level: Not on file   Occupational History    Not on file   Social Needs    Financial resource strain: Not on file    Food insecurity     Worry: Not on file     Inability: Not on file    Transportation needs     Medical: Not on file     Non-medical: Not on file   Tobacco Use    Smoking status: Current Every Day Smoker     Packs/day: 1 00    Smokeless tobacco: Never Used   Substance and Sexual Activity    Alcohol use: Yes     Comment: social    Drug use: No    Sexual activity: Yes     Partners: Male   Lifestyle    Physical activity     Days per week: Not on file     Minutes per session: Not on file    Stress: Not on file   Relationships    Social connections     Talks on phone: Not on file     Gets together: Not on file     Attends [employer] service: Not on file     Active member of club or organization: Not on file     Attends meetings of clubs or organizations: Not on file     Relationship status: Not on file    Intimate partner violence     Fear of current or ex partner: Not on file     Emotionally abused: Not on file     Physically abused: Not on file     Forced sexual activity: Not on file   Other Topics Concern    Not on file   Social History Narrative    Not on file      Family History   Problem Relation Age of Onset    Mental illness Mother     Obesity Mother     Diabetes Mother     Throat cancer Father      Past Surgical History:   Procedure Laterality Date     SECTION  2018    TONSILECTOMY AND ADNOIDECTOMY      TONSILECTOMY AND ADNOIDECTOMY      WISDOM TOOTH EXTRACTION         Current Outpatient Medications:     cetirizine (ZyrTEC) 10 mg tablet, Take 10 mg by mouth daily, Disp: , Rfl:     famotidine (PEPCID) 20 mg tablet, Take 20 mg by mouth 2 (two) times a day, Disp: , Rfl:    Janumet  MG per tablet, , Disp: , Rfl:     lisinopril (ZESTRIL) 10 mg tablet, , Disp: , Rfl:     medroxyPROGESTERone (DEPO-PROVERA) 150 mg/mL injection, , Disp: , Rfl:   Allergies   Allergen Reactions    Bee Venom Anaphylaxis    Sulfamethoxazole-Trimethoprim Anaphylaxis    Penicillins Hives     Vitals:    01/25/21 1600   BP: 122/80   BP Location: Right arm   Patient Position: Sitting   Cuff Size: Standard   Pulse: 97   SpO2: 98%   Weight: 107 kg (235 lb)   Height: 5' 3" (1 6 m)       Labs:  Orders Only on 12/23/2020   Component Date Value    SARS-CoV-2  12/23/2020 Not Detected    Lab on 11/18/2020   Component Date Value    WBC 11/18/2020 12 20*    RBC 11/18/2020 4 86     Hemoglobin 11/18/2020 14 7     Hematocrit 11/18/2020 44 8     MCV 11/18/2020 92     MCH 11/18/2020 30 2     MCHC 11/18/2020 32 8     RDW 11/18/2020 12 6     MPV 11/18/2020 13 5*    Platelets 64/24/8408 164     nRBC 11/18/2020 0     Neutrophils Relative 11/18/2020 66     Immat GRANS % 11/18/2020 1     Lymphocytes Relative 11/18/2020 27     Monocytes Relative 11/18/2020 4     Eosinophils Relative 11/18/2020 2     Basophils Relative 11/18/2020 0     Neutrophils Absolute 11/18/2020 8 06*    Immature Grans Absolute 11/18/2020 0 06     Lymphocytes Absolute 11/18/2020 3 28     Monocytes Absolute 11/18/2020 0 54     Eosinophils Absolute 11/18/2020 0 21     Basophils Absolute 11/18/2020 0 05     Sodium 11/18/2020 140     Potassium 11/18/2020 4 4     Chloride 11/18/2020 106     CO2 11/18/2020 26     ANION GAP 11/18/2020 8     BUN 11/18/2020 13     Creatinine 11/18/2020 0 72     Glucose, Fasting 11/18/2020 255*    Calcium 11/18/2020 10 1     AST 11/18/2020 18     ALT 11/18/2020 34     Alkaline Phosphatase 11/18/2020 103     Total Protein 11/18/2020 7 0     Albumin 11/18/2020 3 7     Total Bilirubin 11/18/2020 0 31     eGFR 11/18/2020 106     Free T4 11/18/2020 1 19     Cholesterol 11/18/2020 219*    Triglycerides 11/18/2020 212*    HDL, Direct 11/18/2020 37*    LDL Calculated 11/18/2020 140*    Non-HDL-Chol (CHOL-HDL) 11/18/2020 182     TSH 3RD GENERATON 11/18/2020 1 440      Lab Results   Component Value Date    TRIG 212 (H) 11/18/2020    HDL 37 (L) 11/18/2020     Imaging: No results found  Review of Systems:  Review of Systems    Physical Exam:  Physical Exam  Blood pressure 122/80, pulse 97, height 5' 3" (1 6 m), weight 107 kg (235 lb), SpO2 98 %, not currently breastfeeding  EKG:      Discussion/Summary:  Pre-op cardiac eval: with no significant risk factors for CAD other than DM, current smoker, but no active symptoms, exam, or EKG findings suggesting active ischemia, arrhythmia, or CHF  Proceed with planned intermediate risk bariatric surgery without any further cardiac testing

## 2021-01-25 NOTE — PATIENT INSTRUCTIONS
Weight Management   AMBULATORY CARE:   Why it is important to manage your weight:  Being overweight increases your risk of health conditions such as heart disease, high blood pressure, type 2 diabetes, and certain types of cancer  It can also increase your risk for osteoarthritis, sleep apnea, and other respiratory problems  Aim for a slow, steady weight loss  Even a small amount of weight loss can lower your risk of health problems  How to lose weight safely:  A safe and healthy way to lose weight is to eat fewer calories and get regular exercise  · You can lose up about 1 pound a week by decreasing the number of calories you eat by 500 calories each day  You can decrease calories by eating smaller portion sizes or by cutting out high-calorie foods  Read labels to find out how many calories are in the foods you eat  · You can also burn calories with exercise such as walking, swimming, or biking  You will be more likely to keep weight off if you make these changes part of your lifestyle  Exercise at least 30 minutes per day on most days of the week  You can also fit in more physical activity by taking the stairs instead of the elevator or parking farther away from stores  Ask your healthcare provider about the best exercise plan for you  Healthy meal plan for weight management:  A healthy meal plan includes a variety of foods, contains fewer calories, and helps you stay healthy  A healthy meal plan includes the following:     · Eat whole-grain foods more often  A healthy meal plan should contain fiber  Fiber is the part of grains, fruits, and vegetables that is not broken down by your body  Whole-grain foods are healthy and provide extra fiber in your diet  Some examples of whole-grain foods are whole-wheat breads and pastas, oatmeal, brown rice, and bulgur  · Eat a variety of vegetables every day  Include dark, leafy greens such as spinach, kale, merry greens, and mustard greens   Eat yellow and orange vegetables such as carrots, sweet potatoes, and winter squash  · Eat a variety of fruits every day  Choose fresh or canned fruit (canned in its own juice or light syrup) instead of juice  Fruit juice has very little or no fiber  · Eat low-fat dairy foods  Drink fat-free (skim) milk or 1% milk  Eat fat-free yogurt and low-fat cottage cheese  Try low-fat cheeses such as mozzarella and other reduced-fat cheeses  · Choose meat and other protein foods that are low in fat  Choose beans or other legumes such as split peas or lentils  Choose fish, skinless poultry (chicken or turkey), or lean cuts of red meat (beef or pork)  Before you cook meat or poultry, cut off any visible fat  · Use less fat and oil  Try baking foods instead of frying them  Add less fat, such as margarine, sour cream, regular salad dressing and mayonnaise to foods  Eat fewer high-fat foods  Some examples of high-fat foods include french fries, doughnuts, ice cream, and cakes  · Eat fewer sweets  Limit foods and drinks that are high in sugar  This includes candy, cookies, regular soda, and sweetened drinks  Ways to decrease calories:   · Eat smaller portions  ? Use a small plate with smaller servings  ? Do not eat second helpings  ? When you eat at a restaurant, ask for a box and place half of your meal in the box before you eat  ? Share an entrée with someone else  · Replace high-calorie snacks with healthy, low-calorie snacks  ? Choose fresh fruit, vegetables, fat-free rice cakes, or air-popped popcorn instead of potato chips, nuts, or chocolate  ? Choose water or calorie-free drinks instead of soda or sweetened drinks  · Do not shop for groceries when you are hungry  You may be more likely to make unhealthy food choices  Take a grocery list of healthy foods and shop after you have eaten  · Eat regular meals  Do not skip meals  Skipping meals can lead to overeating later in the day   This can make it harder for you to lose weight  Eat a healthy snack in place of a meal if you do not have time to eat a regular meal  Talk with a dietitian to help you create a meal plan and schedule that is right for you  Other things to consider as you try to lose weight:   · Be aware of situations that may give you the urge to overeat, such as eating while watching television  Find ways to avoid these situations  For example, read a book, go for a walk, or do crafts  · Meet with a weight loss support group or friends who are also trying to lose weight  This may help you stay motivated to continue working on your weight loss goals  © Copyright [address] Information is for End User's use only and may not be sold, redistributed or otherwise used for commercial purposes  All illustrations and images included in CareNotes® are the copyrighted property of [employer]  or [facility][note 3] Jose Luis May   The above information is an  only  It is not intended as medical advice for individual conditions or treatments  Talk to your doctor, nurse or pharmacist before following any medical regimen to see if it is safe and effective for you

## 2021-01-25 NOTE — PROGRESS NOTES
Cardiology Consultation     Osiel May  [id number]  1980  HEART & VASCULAR [facility]  [facility] [address]    1  Pre-op evaluation  POCT ECG   2  Obesity, unspecified classification, unspecified obesity type, unspecified whether serious comorbidity present  Ambulatory referral to Cardiology   3  Pre-operative cardiovascular examination     4  Obesity, Class II, BMI 35-39 9       Chief Complaint   Patient presents with    Pre-op Exam     bariactric surgery     HPI: Patient is here for cardiac evaluation prior to bariatric surgery  Patient feels well, without complaints  No reported chest pain, shortness of breath, palpitations, lightheadedness, syncope, LE edema, orthopnea, PND, or significant weight changes  Patient remains active without any increased fatigue out of the ordinary        Patient Active Problem List   Diagnosis    Obesity, Class II, BMI 35-39 9    Diabetes mellitus type 2 in obese [facility])    Pre-operative cardiovascular examination     Past Medical History:   Diagnosis Date    Asthma     Diabetes mellitus (HCC)     GERD (gastroesophageal reflux disease)     IBS (irritable bowel syndrome)     Kidney stones     Kidney stones     Psychiatric disorder     anxiety, depression, PTSD    PTSD (post-traumatic stress disorder)      Social History     Socioeconomic History    Marital status: /Civil Union     Spouse name: Not on file    Number of children: Not on file    Years of education: Not on file    Highest education level: Not on file   Occupational History    Not on file   Social Needs    Financial resource strain: Not on file    Food insecurity     Worry: Not on file     Inability: Not on file    Transportation needs     Medical: Not on file     Non-medical: Not on file   Tobacco Use    Smoking status: Current Every Day Smoker     Packs/day: 1 00    Smokeless tobacco: Never Used   Substance and Sexual Activity    Alcohol use: Yes     Comment: social    Drug use: No    Sexual activity: Yes     Partners: Male   Lifestyle    Physical activity     Days per week: Not on file     Minutes per session: Not on file    Stress: Not on file   Relationships    Social connections     Talks on phone: Not on file     Gets together: Not on file     Attends [employer] service: Not on file     Active member of club or organization: Not on file     Attends meetings of clubs or organizations: Not on file     Relationship status: Not on file    Intimate partner violence     Fear of current or ex partner: Not on file     Emotionally abused: Not on file     Physically abused: Not on file     Forced sexual activity: Not on file   Other Topics Concern    Not on file   Social History Narrative    Not on file      Family History   Problem Relation Age of Onset    Mental illness Mother     Obesity Mother     Diabetes Mother     Throat cancer Father      Past Surgical History:   Procedure Laterality Date     SECTION  2018    TONSILECTOMY AND ADNOIDECTOMY      TONSILECTOMY AND ADNOIDECTOMY      WISDOM TOOTH EXTRACTION         Current Outpatient Medications:     cetirizine (ZyrTEC) 10 mg tablet, Take 10 mg by mouth daily, Disp: , Rfl:     famotidine (PEPCID) 20 mg tablet, Take 20 mg by mouth 2 (two) times a day, Disp: , Rfl:     Janumet  MG per tablet, , Disp: , Rfl:     lisinopril (ZESTRIL) 10 mg tablet, , Disp: , Rfl:     medroxyPROGESTERone (DEPO-PROVERA) 150 mg/mL injection, , Disp: , Rfl:   Allergies   Allergen Reactions    Bee Venom Anaphylaxis    Sulfamethoxazole-Trimethoprim Anaphylaxis    Penicillins Hives     Vitals:    21 1600   BP: 122/80   BP Location: Right arm   Patient Position: Sitting   Cuff Size: Standard   Pulse: 97   SpO2: 98%   Weight: 107 kg (235 lb)   Height: 5' 3" (1 6 m)       Labs:  Orders Only on 2020   Component Date Value    SARS-CoV-2  12/23/2020 Not Detected    Lab on 11/18/2020   Component Date Value    WBC 11/18/2020 12 20*    RBC 11/18/2020 4 86     Hemoglobin 11/18/2020 14 7     Hematocrit 11/18/2020 44 8     MCV 11/18/2020 92     MCH 11/18/2020 30 2     MCHC 11/18/2020 32 8     RDW 11/18/2020 12 6     MPV 11/18/2020 13 5*    Platelets 68/54/1676 164     nRBC 11/18/2020 0     Neutrophils Relative 11/18/2020 66     Immat GRANS % 11/18/2020 1     Lymphocytes Relative 11/18/2020 27     Monocytes Relative 11/18/2020 4     Eosinophils Relative 11/18/2020 2     Basophils Relative 11/18/2020 0     Neutrophils Absolute 11/18/2020 8 06*    Immature Grans Absolute 11/18/2020 0 06     Lymphocytes Absolute 11/18/2020 3 28     Monocytes Absolute 11/18/2020 0 54     Eosinophils Absolute 11/18/2020 0 21     Basophils Absolute 11/18/2020 0 05     Sodium 11/18/2020 140     Potassium 11/18/2020 4 4     Chloride 11/18/2020 106     CO2 11/18/2020 26     ANION GAP 11/18/2020 8     BUN 11/18/2020 13     Creatinine 11/18/2020 0 72     Glucose, Fasting 11/18/2020 255*    Calcium 11/18/2020 10 1     AST 11/18/2020 18     ALT 11/18/2020 34     Alkaline Phosphatase 11/18/2020 103     Total Protein 11/18/2020 7 0     Albumin 11/18/2020 3 7     Total Bilirubin 11/18/2020 0 31     eGFR 11/18/2020 106     Free T4 11/18/2020 1 19     Cholesterol 11/18/2020 219*    Triglycerides 11/18/2020 212*    HDL, Direct 11/18/2020 37*    LDL Calculated 11/18/2020 140*    Non-HDL-Chol (CHOL-HDL) 11/18/2020 182     TSH 3RD GENERATON 11/18/2020 1 440      Lab Results   Component Value Date    TRIG 212 (H) 11/18/2020    HDL 37 (L) 11/18/2020     Imaging: No results found  Review of Systems:  Review of Systems   Constitutional: Negative for activity change, appetite change, chills, diaphoresis, fatigue and unexpected weight change  HENT: Negative for hearing loss, nosebleeds and sore throat      Eyes: Negative for photophobia and visual disturbance  Respiratory: Negative for cough, chest tightness, shortness of breath and wheezing  Cardiovascular: Negative for chest pain, palpitations and leg swelling  Gastrointestinal: Negative for abdominal pain, diarrhea, nausea and vomiting  Endocrine: Negative for polyuria  Genitourinary: Negative for dysuria, frequency and hematuria  Musculoskeletal: Negative for arthralgias, back pain, gait problem and neck pain  Skin: Negative for pallor and rash  Neurological: Negative for dizziness, syncope and headaches  Hematological: Does not bruise/bleed easily  Psychiatric/Behavioral: Negative for behavioral problems and confusion  Physical Exam:  Physical Exam  Vitals signs reviewed  Constitutional:       Appearance: She is well-developed  She is not diaphoretic  HENT:      Head: Normocephalic and atraumatic  Nose: Nose normal    Eyes:      General: No scleral icterus  Pupils: Pupils are equal, round, and reactive to light  Neck:      Musculoskeletal: Normal range of motion and neck supple  Vascular: No JVD  Cardiovascular:      Rate and Rhythm: Normal rate and regular rhythm  Heart sounds: Normal heart sounds  No murmur  No friction rub  No gallop  Pulmonary:      Effort: Pulmonary effort is normal  No respiratory distress  Breath sounds: Normal breath sounds  No wheezing or rales  Abdominal:      General: Bowel sounds are normal  There is no distension  Palpations: Abdomen is soft  Tenderness: There is no abdominal tenderness  Musculoskeletal: Normal range of motion  General: No deformity  Skin:     General: Skin is warm and dry  Findings: No rash  Neurological:      Mental Status: She is alert and oriented to person, place, and time  Cranial Nerves: No cranial nerve deficit     Psychiatric:         Behavior: Behavior normal        Blood pressure 122/80, pulse 97, height 5' 3" (1 6 m), weight 107 kg (235 lb), SpO2 98 %, not currently breastfeeding  EKG:  Normal sinus rhythm  Left axis deviation  Pulmonary disease pattern  Abnormal ECG    Discussion/Summary:  Pre-op cardiac eval: with risk factors for CAD including recent diagnosis of DM, elevated cholesterol, and current smoker, but no active symptoms, exam, or EKG findings suggesting active ischemia, arrhythmia, or CHF  Proceed with planned intermediate risk bariatric surgery without any further cardiac testing

## 2021-02-22 ENCOUNTER — TELEPHONE (OUTPATIENT)
Dept: BARIATRICS | Facility: CLINIC | Age: 41
End: 2021-02-22

## 2021-02-22 NOTE — TELEPHONE ENCOUNTER
Call to patient to follow up about surgery  States she is still very much interested in surgery; has been to busy to call since my last call and missed appointment  Scheduled to see patient for 3/4 2021 at 3:30 pm  Patient asked who to call to find out OOP costs; directed her to call Financial office at hospital; phone number on  workflow to get information about OOP costs

## 2021-03-04 ENCOUNTER — OFFICE VISIT (OUTPATIENT)
Dept: BARIATRICS | Facility: CLINIC | Age: 41
End: 2021-03-04

## 2021-03-04 DIAGNOSIS — E66.9 OBESITY, CLASS I, BMI 30-34.9: Primary | ICD-10-CM

## 2021-03-04 PROCEDURE — RECHECK

## 2021-03-05 VITALS — WEIGHT: 218 LBS | BODY MASS INDEX: 38.62 KG/M2

## 2021-03-05 NOTE — PROGRESS NOTES
WT CHK/Support visit  Patient lost 15 lbs since last visit  Patient met weight loss goal for surgery  Patient getting lots of activity caring for 3year old daughter, household chores, and doing food delivery services on weekends  Patient continues to work on small portions and has better portion control  Reviewed psychiatric paperwork that makes recommendation of "R/O BED " Explained to patient she will need to have evaluation for this and provided resource for her to call and schedule appt  Patient reports she is getting additional insurance and has not received her card yet  Asked to provide insurance information once she receives card  Patient had questions about OOP costs; I will follow up with Coordinator and get back to patient with follow up information  Patient scheduled for next month

## 2021-03-31 ENCOUNTER — OFFICE VISIT (OUTPATIENT)
Dept: BARIATRICS | Facility: CLINIC | Age: 41
End: 2021-03-31

## 2021-03-31 DIAGNOSIS — E66.9 OBESITY, CLASS I, BMI 30-34.9: Primary | ICD-10-CM

## 2021-03-31 PROCEDURE — RECHECK

## 2021-03-31 NOTE — PROGRESS NOTES
WT CHK/Support visit  Phone visit  Patient not able to provide weight for me today as she does not have a scale  Continue to discuss reasons patient is required to have evaluation to rule out binge eating disorder, as her psychiatrist recommended it and I am obligated to follow physician recommendations and do everything to ensure it is safe for her to have the surgery    Patient reported calling agency and no one returned her call  Patient agreeable to psychiatric evaluation by [facility]  Patient has continued to look for therapist and having difficulty with it  Reviewed workflow; still need PCP letter; will resend email for SG  Patient continues to work on quitting smoking using Chantix  Smokes 3-6 cigarettes a day depending on her stress level  Patient asked how she will manage stress once she stops smoking  Patient states she is "working on it "  Patient scheduled for next month

## 2021-04-21 ENCOUNTER — OFFICE VISIT (OUTPATIENT)
Dept: BARIATRICS | Facility: CLINIC | Age: 41
End: 2021-04-21

## 2021-04-21 DIAGNOSIS — Z98.84 BARIATRIC SURGERY STATUS: Primary | ICD-10-CM

## 2021-04-21 PROCEDURE — RECHECK

## 2021-04-22 ENCOUNTER — TELEPHONE (OUTPATIENT)
Dept: PSYCHIATRY | Facility: CLINIC | Age: 41
End: 2021-04-22

## 2021-04-22 VITALS — WEIGHT: 220 LBS | BODY MASS INDEX: 38.97 KG/M2

## 2021-04-22 NOTE — PROGRESS NOTES
WT CHK  Phone visit  Patient verified name and ; patient is alone; my office door is closed  Patient was offered live visit; is consenting to phone visit and understands there is no charge for today's visit  Patient continues to express frustration with process involved for her to get to surgery  Patient continues to smoke three cigarettes a day and continues to use Chantix to quit  Patient encouraged to quit completely  Reviewed workflow; patient needs PCP letter and to complete pod casts  Patient did not receive email sent with pod casts; resent email during visit that she received so now may complete pod casts  Patient had not heard back from agencies regarding evaluation for eating disorder  I will follow up with [facility] scheduling person and see if I am able to expedite her getting an appointment  Patient has not called her insurance to find out OPP costs; patient encouraged to do so as she doesn't have any idea how much costs will be  Patient insists she will get the money for surgery  Patient scheduled again in two weeks  Will follow up with Eulalio Sutton psychiatry

## 2021-04-22 NOTE — TELEPHONE ENCOUNTER
Behavorial Health Outpatient Intake Questions    Referred by: Weight Management    Please advised interviewee that they need to answer all questions truthfully to allow for best care and any misrepresentations of information may affect their ability to be seen at this clinic   => Was this discussed? Yes     BehavGeneral acute hospital Health Outpatient Intake History -     Presenting Problem (in patient's words):   Bariatric surgery clearance   PTSD, anxiety and depression  Are there any developmental disabilities? ? If yes, can they speak to you on the phone? If they are too limited to speak to you on phone, refer out Yes  Dyslexia     Are you taking any psychiatric medications? No    => If yes, who prescribes? If yes, are they injectable medications? Does the patient have a language barrier or hearing impairment? No    Have you been treated at [facility] by a therapist or a doctor in the past? If yes, who? No    Has the patient been hospitalized for mental health? No   If yes, how long ago was last hospitalization and where was it? At 17years old     Do you actively use alcohol or marijuana or illegal substances? If yes, what and how much - refer out to Drug and alcohol treatment if use is excessive or daily use of illegal substances No concerns of substance abuse are reported  Do you have a community treatment team or ? No    Legal History-     Does the patient have any history of arrests, senior living/skilled nursing time, or DUIs? Yes  If Yes-  1) What types of charges? 2) When were they last incarcerated? 3) Are they currently on parole or probation? No    Minor Child-    Who has custody of the child? Is there a custody agreement? If there is a custody agreement remind parent that they must bring a copy to the first appt or they will not be seen       Intake Team, please check with provider before scheduling if flags come up such as:  - complex case  - legal history (other than DUI)  - communication barrier concerns are present  - if, in your judgment, this needs further review    ACCEPTED as a patient :   => Appointment Date: TBD     Referred Elsewhere? Name of Insurance Co:  Insurance ID#  [employer] #  If ins is primary or secondary  If patient is a minor, parents information such as Name, D  O B of guarantor

## 2021-04-28 ENCOUNTER — TELEPHONE (OUTPATIENT)
Dept: PSYCHIATRY | Facility: CLINIC | Age: 41
End: 2021-04-28

## 2021-04-28 NOTE — TELEPHONE ENCOUNTER
Patient is waiting for a call to set up an apt the intake was completed can you please give her a call thank you

## 2021-04-30 PROBLEM — K59.02 CONSTIPATION DUE TO PELVIC FLOOR OUTLET OBSTRUCTION: Status: ACTIVE | Noted: 2021-04-30

## 2021-04-30 PROBLEM — K62.5 RECTAL BLEEDING: Status: ACTIVE | Noted: 2021-04-30

## 2021-04-30 PROBLEM — K64.8 OTHER HEMORRHOIDS: Status: ACTIVE | Noted: 2021-04-30

## 2021-05-05 ENCOUNTER — OFFICE VISIT (OUTPATIENT)
Dept: BARIATRICS | Facility: CLINIC | Age: 41
End: 2021-05-05

## 2021-05-05 VITALS — WEIGHT: 214 LBS | BODY MASS INDEX: 37.91 KG/M2

## 2021-05-05 DIAGNOSIS — E66.9 OBESITY, CLASS I, BMI 30-34.9: Primary | ICD-10-CM

## 2021-05-05 PROCEDURE — RECHECK

## 2021-05-05 NOTE — PROGRESS NOTES
Phone visit  Patient identified her name and ; patient is alone; my office door is closed; patient was offered a live visit; is now accepting a phone visit; patient understands there is no charge for today's visit  WT CHK/Support visit  Patient was contacted by [facility]; appt scheduled for ; I will contact Practice Administrator and see if possible to move it up as patient is close to having workflow completed  Patient changed PCP's and needs to speak with new physician about referral for bariatric surgery  Patient continues to work on quitting smoking; however, concerned she will start eating when she quits completely  Reviewed workflow; patient stated clearly it is not helpful to her to be told or reminded what she still needs to do  She is doing the best she can to prepare for surgery and being reminded creates more stress for her  I apologized and explained my intention is not to create stress but to be helpful to let her know what she needs to be completed  Patient plans to complete podcasts SG this weekend  Patient scheduled again in 3 weeks

## 2021-05-26 ENCOUNTER — OFFICE VISIT (OUTPATIENT)
Dept: BARIATRICS | Facility: CLINIC | Age: 41
End: 2021-05-26

## 2021-05-26 DIAGNOSIS — E66.9 OBESITY, CLASS I, BMI 30-34.9: Primary | ICD-10-CM

## 2021-05-26 PROCEDURE — RECHECK

## 2021-05-26 NOTE — PROGRESS NOTES
Phone visit  Patient verified name and ; patient is alone  My office door is closed  Patient was offered a live visit; is now consenting to a phone visit and understands there is no charge for today's visit  Patient unable to provide weight today  States she is maintaining her weight; reports having gone from size 22 to a 16 that she is happy about  Explained now that the psychiatric appt is listed in her chart, and I now have the name of the practice administrator, I will email to see if it's possible for the appt to be moved up since it is to be a virtual visit and could be done from any psychiatric office  Patient expressed her appreciation for efforts to get this appt sooner  Also, expressed her frustration with how long it is taking  I apologized and explained the importance of the evaluation so we are doing everything we can to make the surgery as safe as possible for her  Patient has asked PCP for referral; still not in chart; patient will contact PCP about referral and complete pod casts  Will follow up with patient once I have response from [address]

## 2021-06-01 ENCOUNTER — TELEPHONE (OUTPATIENT)
Dept: GASTROENTEROLOGY | Facility: HOSPITAL | Age: 41
End: 2021-06-01

## 2021-06-02 ENCOUNTER — ANESTHESIA EVENT (OUTPATIENT)
Dept: GASTROENTEROLOGY | Facility: HOSPITAL | Age: 41
End: 2021-06-02

## 2021-06-02 ENCOUNTER — HOSPITAL ENCOUNTER (OUTPATIENT)
Dept: GASTROENTEROLOGY | Facility: HOSPITAL | Age: 41
Setting detail: OUTPATIENT SURGERY
Discharge: HOME | End: 2021-06-02
Attending: COLON & RECTAL SURGERY | Admitting: COLON & RECTAL SURGERY
Payer: COMMERCIAL

## 2021-06-02 ENCOUNTER — ANESTHESIA (OUTPATIENT)
Dept: GASTROENTEROLOGY | Facility: HOSPITAL | Age: 41
End: 2021-06-02

## 2021-06-02 VITALS
HEIGHT: 63 IN | RESPIRATION RATE: 18 BRPM | TEMPERATURE: 97.1 F | OXYGEN SATURATION: 100 % | WEIGHT: 214 LBS | BODY MASS INDEX: 37.92 KG/M2 | SYSTOLIC BLOOD PRESSURE: 137 MMHG | HEART RATE: 77 BPM | DIASTOLIC BLOOD PRESSURE: 90 MMHG

## 2021-06-02 DIAGNOSIS — K59.02 CONSTIPATION DUE TO PELVIC FLOOR OUTLET OBSTRUCTION: ICD-10-CM

## 2021-06-02 DIAGNOSIS — K62.5 RECTAL BLEEDING: ICD-10-CM

## 2021-06-02 DIAGNOSIS — K64.8 OTHER HEMORRHOIDS: ICD-10-CM

## 2021-06-02 DIAGNOSIS — Z12.11 COLON CANCER SCREENING: ICD-10-CM

## 2021-06-02 PROBLEM — F17.200 SMOKING: Status: ACTIVE | Noted: 2021-06-02

## 2021-06-02 LAB
EXT PREGNANCY TEST URINE: NEGATIVE
EXT. CONTROL: NORMAL
GLUCOSE SERPL-MCNC: 225 MG/DL (ref 65–140)
GLUCOSE SERPL-MCNC: 274 MG/DL (ref 65–140)
GLUCOSE SERPL-MCNC: 342 MG/DL (ref 65–140)

## 2021-06-02 PROCEDURE — 45378 DIAGNOSTIC COLONOSCOPY: CPT | Performed by: COLON & RECTAL SURGERY

## 2021-06-02 PROCEDURE — 82948 REAGENT STRIP/BLOOD GLUCOSE: CPT

## 2021-06-02 PROCEDURE — 81025 URINE PREGNANCY TEST: CPT | Performed by: ANESTHESIOLOGY

## 2021-06-02 RX ORDER — SODIUM CHLORIDE 9 MG/ML
INJECTION, SOLUTION INTRAVENOUS CONTINUOUS PRN
Status: DISCONTINUED | OUTPATIENT
Start: 2021-06-02 | End: 2021-06-02

## 2021-06-02 RX ORDER — VARENICLINE TARTRATE 1 MG/1
1 TABLET, FILM COATED ORAL 2 TIMES DAILY
COMMUNITY

## 2021-06-02 RX ORDER — PROPOFOL 10 MG/ML
INJECTION, EMULSION INTRAVENOUS AS NEEDED
Status: DISCONTINUED | OUTPATIENT
Start: 2021-06-02 | End: 2021-06-02

## 2021-06-02 RX ORDER — LIDOCAINE HYDROCHLORIDE 10 MG/ML
INJECTION, SOLUTION EPIDURAL; INFILTRATION; INTRACAUDAL; PERINEURAL AS NEEDED
Status: DISCONTINUED | OUTPATIENT
Start: 2021-06-02 | End: 2021-06-02

## 2021-06-02 RX ADMIN — PROPOFOL 50 MG: 10 INJECTION, EMULSION INTRAVENOUS at 10:56

## 2021-06-02 RX ADMIN — PROPOFOL 100 MG: 10 INJECTION, EMULSION INTRAVENOUS at 10:51

## 2021-06-02 RX ADMIN — PROPOFOL 50 MG: 10 INJECTION, EMULSION INTRAVENOUS at 11:01

## 2021-06-02 RX ADMIN — PROPOFOL 50 MG: 10 INJECTION, EMULSION INTRAVENOUS at 11:05

## 2021-06-02 RX ADMIN — SODIUM CHLORIDE: 0.9 INJECTION, SOLUTION INTRAVENOUS at 10:46

## 2021-06-02 RX ADMIN — INSULIN HUMAN 5 UNITS: 100 INJECTION, SOLUTION PARENTERAL at 10:51

## 2021-06-02 RX ADMIN — LIDOCAINE HYDROCHLORIDE 50 MG: 10 INJECTION, SOLUTION EPIDURAL; INFILTRATION; INTRACAUDAL; PERINEURAL at 10:51

## 2021-06-02 RX ADMIN — SODIUM CHLORIDE: 0.9 INJECTION, SOLUTION INTRAVENOUS at 10:57

## 2021-06-02 RX ADMIN — PROPOFOL 50 MG: 10 INJECTION, EMULSION INTRAVENOUS at 10:53

## 2021-06-02 RX ADMIN — INSULIN HUMAN 10 UNITS: 100 INJECTION, SOLUTION PARENTERAL at 10:07

## 2021-06-02 NOTE — ANESTHESIA PREPROCEDURE EVALUATION
Procedure:  COLONOSCOPY    Relevant Problems   ENDO   (+) Diabetes mellitus type 2 in obese (HCC)      GI/HEPATIC   (+) Rectal bleeding      PULMONARY   (+) Smoking      Other   (+) Obesity, Class II, BMI 35-39 9      Uncontrolled Diabetes    Gave 10U regular insulin 10U and repeat FS was 270  Will proceed with procedure and give 5U regular insulin intraprocedurally and repeat post-procedure  UPreg negative  Physical Exam    Airway    Mallampati score: II  TM Distance: >3 FB  Neck ROM: full     Dental   No notable dental hx     Cardiovascular  Cardiovascular exam normal    Pulmonary  Pulmonary exam normal     Other Findings        Anesthesia Plan  ASA Score- 3     Anesthesia Type- IV sedation with anesthesia with ASA Monitors  Additional Monitors:   Airway Plan:           Plan Factors-Exercise tolerance (METS): >4 METS  Chart reviewed  EKG reviewed  Existing labs reviewed  Patient summary reviewed  Patient is a current smoker  Patient smoked on day of surgery  Induction- intravenous  Postoperative Plan-     Informed Consent- Anesthetic plan and risks discussed with patient  I personally reviewed this patient with the CRNA  Discussed and agreed on the Anesthesia Plan with the CRNA  Juan Silva

## 2021-06-02 NOTE — H&P
History and Physical   Colon and Rectal Surgery   Ehsan Morris 45 y o  female MRN: [id number]  Unit/Bed#:  Encounter: [id number]  21   9:58 AM      CC:  Rectal bleeding  History of Present Illness   HPI:  Ehsan Morris is a 45 y o  female for colon evaluation to determine bleeding source      Historical Information   Past Medical History:   Diagnosis Date    Asthma     Diabetes mellitus ([address] )     GERD (gastroesophageal reflux disease)     Hypertension     IBS (irritable bowel syndrome)     Kidney stones     Kidney stones     Psychiatric disorder     anxiety, depression, PTSD    PTSD (post-traumatic stress disorder)      Past Surgical History:   Procedure Laterality Date     SECTION  2018    TONSILECTOMY AND ADNOIDECTOMY      TONSILECTOMY AND ADNOIDECTOMY      WISDOM TOOTH EXTRACTION         Meds/Allergies     (Not in a hospital admission)        Current Outpatient Medications:     cetirizine (ZyrTEC) 10 mg tablet, Take 10 mg by mouth daily, Disp: , Rfl:     lisinopril (ZESTRIL) 10 mg tablet, , Disp: , Rfl:     metFORMIN (GLUCOPHAGE) 1000 MG tablet, Take 1,000 mg by mouth 2 (two) times a day with meals Morning and evening, Disp: , Rfl:     varenicline (CHANTIX) 1 mg tablet, Take 1 mg by mouth 2 (two) times a day, Disp: , Rfl:     famotidine (PEPCID) 20 mg tablet, Take 20 mg by mouth 2 (two) times a day, Disp: , Rfl:     Janumet  MG per tablet, , Disp: , Rfl:     medroxyPROGESTERone (DEPO-PROVERA) 150 mg/mL injection, , Disp: , Rfl:     Allergies   Allergen Reactions    Bee Venom Anaphylaxis    Sulfamethoxazole-Trimethoprim Anaphylaxis    Penicillins Hives         Social History   Social History     Substance and Sexual Activity   Alcohol Use Yes    Comment: social     Social History     Substance and Sexual Activity   Drug Use No     Social History     Tobacco Use   Smoking Status Current Every Day Smoker    Packs/day: 0 25   Smokeless Tobacco Never Used         Family History:   Family History   Problem Relation Age of Onset    Mental illness Mother     Obesity Mother     Diabetes Mother     Throat cancer Father     Colon cancer Neg Hx          Objective     Current Vitals:   Blood Pressure: 119/75 (06/02/21 0919)  Pulse: 88 (06/02/21 0919)  Temperature: 98 3 °F (36 8 °C) (06/02/21 0919)  Temp Source: Tympanic (06/02/21 0919)  Respirations: 18 (06/02/21 0919)  Height: 5' 3" (160 cm) (06/02/21 0919)  Weight - Scale: 97 1 kg (214 lb) (06/02/21 0919)  SpO2: 98 % (06/02/21 0919)  No intake or output data in the 24 hours ending 06/02/21 0958    Physical Exam:  General:  Well nourished, no distress  Neuro: Alert and oriented  Eyes:Sclera anicteric, conjunctiva pink  Pulm: Clear to auscultation bilaterally  No respiratory Distress  CV:  Regular rate and rhythm  No murmurs  Abdomen:  Soft, flat, non-tender, without masses or hepatosplenomegaly  Lab Results:       ASSESSMENT:  Patricia Price is a 45 y o  female for colonoscopy  Her glucose is elevated and the procedure is being delayed for control of glucose  PLAN:  Colonoscopy  Risks , including, but not limited to, bleeding, perforation, missed lesions, and potential need for surgery, were reviewed  Alternatives to colonoscopy were discussed    Lenka Cannon MD

## 2021-06-02 NOTE — ANESTHESIA POSTPROCEDURE EVALUATION
Post-Op Assessment Note    CV Status:  Stable  Pain Score: 0    Pain management: adequate     Mental Status:  Arousable   Hydration Status:  Stable   PONV Controlled:  None   Airway Patency:  Patent      Post Op Vitals Reviewed: Yes      Staff: Anesthesiologist, CRNA         No complications documented      /77 (06/02/21 1113)    Temp (!) 97 1 °F (36 2 °C) (06/02/21 1113)    Pulse 80 (06/02/21 1113)   Resp 16 (06/02/21 1113)    SpO2 100 % (06/02/21 1113)

## 2021-07-16 ENCOUNTER — TELEPHONE (OUTPATIENT)
Dept: BARIATRICS | Facility: CLINIC | Age: 41
End: 2021-07-16

## 2021-07-16 NOTE — TELEPHONE ENCOUNTER
Follow up call to patient about recovery from illness  Patient reports doing much better and feeling pretty well  Patient states how frustrated she is with the process; encouraged patient she is close to having everything completed and can move forward with surgery  Let patient know cardio needs to be repeated as it   Patient scheduled for in office visit week after psychiatric appointment

## 2021-07-30 ENCOUNTER — OFFICE VISIT (OUTPATIENT)
Dept: PSYCHIATRY | Facility: CLINIC | Age: 41
End: 2021-07-30
Payer: COMMERCIAL

## 2021-07-30 DIAGNOSIS — F41.1 GENERALIZED ANXIETY DISORDER: ICD-10-CM

## 2021-07-30 DIAGNOSIS — Z86.59 HISTORY OF POSTTRAUMATIC STRESS DISORDER (PTSD): ICD-10-CM

## 2021-07-30 DIAGNOSIS — F33.40 RECURRENT MAJOR DEPRESSIVE DISORDER, IN REMISSION (HCC): Primary | ICD-10-CM

## 2021-07-30 DIAGNOSIS — Z98.84 BARIATRIC SURGERY STATUS: ICD-10-CM

## 2021-07-30 PROCEDURE — 90792 PSYCH DIAG EVAL W/MED SRVCS: CPT | Performed by: PSYCHIATRY & NEUROLOGY
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
## 2021-08-02 ENCOUNTER — TELEPHONE (OUTPATIENT)
Dept: PSYCHIATRY | Facility: CLINIC | Age: 41
End: 2021-08-02

## 2021-08-02 DIAGNOSIS — E66.9 OBESITY, UNSPECIFIED CLASSIFICATION, UNSPECIFIED OBESITY TYPE, UNSPECIFIED WHETHER SERIOUS COMORBIDITY PRESENT: Primary | ICD-10-CM

## 2021-08-03 DIAGNOSIS — E11.69 DIABETES MELLITUS TYPE 2 IN OBESE (HCC): Primary | ICD-10-CM

## 2021-08-03 DIAGNOSIS — Z72.0 NICOTINE ABUSE: ICD-10-CM

## 2021-08-03 DIAGNOSIS — Z01.812 BLOOD TESTS PRIOR TO TREATMENT OR PROCEDURE: ICD-10-CM

## 2021-08-03 DIAGNOSIS — F17.200 SMOKING: ICD-10-CM

## 2021-08-03 DIAGNOSIS — E66.9 DIABETES MELLITUS TYPE 2 IN OBESE (HCC): Primary | ICD-10-CM

## 2021-08-03 DIAGNOSIS — Z01.818 PREOPERATIVE CLEARANCE: ICD-10-CM

## 2021-08-03 DIAGNOSIS — E66.9 OBESITY, CLASS II, BMI 35-39.9: ICD-10-CM

## 2021-08-03 DIAGNOSIS — Z72.0 TOBACCO ABUSE: ICD-10-CM

## 2021-08-04 ENCOUNTER — OFFICE VISIT (OUTPATIENT)
Dept: BARIATRICS | Facility: CLINIC | Age: 41
End: 2021-08-04

## 2021-08-04 ENCOUNTER — OFFICE VISIT (OUTPATIENT)
Dept: CARDIOLOGY CLINIC | Facility: CLINIC | Age: 41
End: 2021-08-04
Payer: COMMERCIAL

## 2021-08-04 ENCOUNTER — TELEPHONE (OUTPATIENT)
Dept: BARIATRICS | Facility: CLINIC | Age: 41
End: 2021-08-04

## 2021-08-04 VITALS
HEIGHT: 63 IN | SYSTOLIC BLOOD PRESSURE: 128 MMHG | WEIGHT: 213.7 LBS | HEART RATE: 92 BPM | DIASTOLIC BLOOD PRESSURE: 76 MMHG | OXYGEN SATURATION: 97 % | BODY MASS INDEX: 37.86 KG/M2

## 2021-08-04 DIAGNOSIS — F17.200 SMOKING: ICD-10-CM

## 2021-08-04 DIAGNOSIS — E66.9 DIABETES MELLITUS TYPE 2 IN OBESE (HCC): ICD-10-CM

## 2021-08-04 DIAGNOSIS — Z98.84 BARIATRIC SURGERY STATUS: ICD-10-CM

## 2021-08-04 DIAGNOSIS — Z01.810 PRE-OPERATIVE CARDIOVASCULAR EXAMINATION: Primary | ICD-10-CM

## 2021-08-04 DIAGNOSIS — E66.9 OBESITY, CLASS I, BMI 30-34.9: Primary | ICD-10-CM

## 2021-08-04 DIAGNOSIS — Z86.59 HISTORY OF POSTTRAUMATIC STRESS DISORDER (PTSD): ICD-10-CM

## 2021-08-04 DIAGNOSIS — E11.69 DIABETES MELLITUS TYPE 2 IN OBESE (HCC): ICD-10-CM

## 2021-08-04 PROCEDURE — 99214 OFFICE O/P EST MOD 30 MIN: CPT | Performed by: INTERNAL MEDICINE

## 2021-08-04 PROCEDURE — RECHECK

## 2021-08-04 PROCEDURE — 93000 ELECTROCARDIOGRAM COMPLETE: CPT | Performed by: INTERNAL MEDICINE

## 2021-08-04 RX ORDER — ORAL SEMAGLUTIDE 7 MG/1
TABLET ORAL DAILY
COMMUNITY
Start: 2021-05-17

## 2021-08-04 NOTE — TELEPHONE ENCOUNTER
Patient came into the office for a visit with SW  She gave SW a new insurance card  Patient was still in the office I went to talk to her about the insurance  I asked which was her primary she said I don't know  I explained to her she will need to let me know so when she is ready to be submitted I send her information to the correct insurance company  She said just use that one it pays 100% I stated I cannot do that if the current insurance still lists her as primary  She stated she was getting a divorce and she does not know how long she will still be on the [place] since it under her   I just asked her to let me know when it comes time for me to send her case  She said she is done with everything as far as she is concerned I already knew she had not quit smoking but I asked her and she said just let me get my surgery done and then I'll worry about that  I told her that is not the way the process is she will need to quit smoking go to be tested after being nicotine free for 30 days  She asked if she can still use nicotine gum or patch to get tested I told her no it will show up in the results  She was angry this has been taking so long and tried to blame her SW because she had to see a Psychiatrist to be cleared and it took forever  She said now she found out she needs to have an EGD done which she was never told  I stated I met with you and explained everything she will need to complete and that was one and its on your check list that you took home that day  She said I don't remember that was so long ago  I stated I understand her frustration her comments went back to the smoking and I said when she is clear for 30 days to let us know we will order the test again she said she has kids she's getting  she wants her surgery and she'll will quit but she can't promise anything after that   I said I know its hard but once you quit you don't want to go back to smoking after surgery you could develop ulcers  Again she said I just want my surgery I'll worry about smoking later and left

## 2021-08-04 NOTE — PROGRESS NOTES
Cardiology Follow Up    Vinnie Nam  1980  [id number]  [facility] [address]  [address]  [phone]  [phone]    1  Pre-operative cardiovascular examination  POCT ECG   2  Diabetes mellitus type 2 in obese [facility])  POCT ECG   3  Bariatric surgery status  POCT ECG   4  History of posttraumatic stress disorder (PTSD)  POCT ECG   5  Smoking  POCT ECG       Discussion/Summary:Patient presents for preoperative cardiovascular risk assessment for gastric bypass surgery  She has a good functional capacity and can complete more than 7 Mets worth of physical activity  No active cardiac symptoms  She is intermediate risk for surgery no further preoperative testing is required  Please return to the office of any further issues or concerns  Interval History:   41-year-old female with diabetes, hypertension, dyslipidemia presents for preoperative cardiovascular risk assessment she also has a history of tobacco abuse  She remains very physically active she works for [employer] she does a lot of physical activity with this program   She can easily complete 7 Mets worth of physical activity  There is a remote history of asthma  Denies any exertional chest pain or discomfort  There has been no shortness of breath, palpitations, lightheadedness, dizziness, or syncope  She has been taking all medications as prescribed  She has upcoming gastric bypass surgery  Unfortunately she smokes half a pack cigarettes a day  She is working on quitting      Medical Problems     Problem List     Obesity, Class II, BMI 35-39 9    Diabetes mellitus type 2 in obese [facility])      Lab Results   Component Value Date    HGBA1C 6 5 (H) 12/09/2019         Pre-operative cardiovascular examination    Other hemorrhoids    Rectal bleeding    Constipation due to pelvic floor outlet obstruction    Smoking    Generalized anxiety disorder    History of posttraumatic stress disorder (PTSD)    Recurrent major depressive disorder, in remission [facility])    Bariatric surgery status              Past Medical History:   Diagnosis Date    Asthma     Diabetes mellitus ([address] )     GERD (gastroesophageal reflux disease)     Hypertension     IBS (irritable bowel syndrome)     Kidney stones     Kidney stones     Psychiatric disorder     anxiety, depression, PTSD    PTSD (post-traumatic stress disorder)      Social History     Socioeconomic History    Marital status: /Civil Union     Spouse name: Not on file    Number of children: Not on file    Years of education: Not on file    Highest education level: Not on file   Occupational History    Not on file   Tobacco Use    Smoking status: Current Every Day Smoker     Packs/day: 0 25    Smokeless tobacco: Never Used   Vaping Use    Vaping Use: Never used   Substance and Sexual Activity    Alcohol use: Yes     Comment: social    Drug use: No    Sexual activity: Yes     Partners: Male   Other Topics Concern    Not on file   Social History Narrative    Not on file     Social Determinants of Health     Financial Resource Strain:     Difficulty of Paying Living Expenses:    Food Insecurity:     Worried About Running Out of Food in the Last Year:     Ran Out of Food in the Last Year:    Transportation Needs:     Lack of Transportation (Medical):      Lack of Transportation (Non-Medical):    Physical Activity:     Days of Exercise per Week:     Minutes of Exercise per Session:    Stress:     Feeling of Stress :    Social Connections:     Frequency of Communication with Friends and Family:     Frequency of Social Gatherings with Friends and Family:     Attends Hinduism Services:     Active Member of Clubs or Organizations:     Attends Club or Organization Meetings:     Marital Status:    Intimate Partner Violence:     Fear of Current or Ex-Partner:     Emotionally Abused:     Physically Abused:     Sexually Abused:       Family History   Problem Relation Age of Onset    Mental illness Mother     Obesity Mother     Diabetes Mother     Throat cancer Father     Colon cancer Neg Hx      Past Surgical History:   Procedure Laterality Date     SECTION  2018    TONSILECTOMY AND ADNOIDECTOMY      TONSILECTOMY AND ADNOIDECTOMY      WISDOM TOOTH EXTRACTION         Current Outpatient Medications:     cetirizine (ZyrTEC) 10 mg tablet, Take 10 mg by mouth daily, Disp: , Rfl:     lisinopril (ZESTRIL) 10 mg tablet, Take 10 mg by mouth daily , Disp: , Rfl:     medroxyPROGESTERone (DEPO-PROVERA) 150 mg/mL injection, Inject 150 mg into a muscle every 3 (three) months , Disp: , Rfl:     metFORMIN (GLUCOPHAGE) 1000 MG tablet, Take 1,000 mg by mouth 2 (two) times a day with meals Morning and evening, Disp: , Rfl:     Semaglutide (Rybelsus) 7 MG TABS, Take by mouth Daily, Disp: , Rfl:     varenicline (CHANTIX) 1 mg tablet, Take 1 mg by mouth 2 (two) times a day, Disp: , Rfl:     famotidine (PEPCID) 20 mg tablet, Take 20 mg by mouth 2 (two) times a day (Patient not taking: Reported on 2021), Disp: , Rfl:     Janumet  MG per tablet, , Disp: , Rfl:   Allergies   Allergen Reactions    Bee Venom Anaphylaxis    Sulfamethoxazole-Trimethoprim Anaphylaxis    Penicillins Hives       Labs:     Chemistry        Component Value Date/Time     2015 0011    K 4 4 2020 0822    K 3 9 2015 0011     2020 0822     2015 0011    CO2 26 2020 0822    CO2 28 2015 0011    BUN 13 2020 0822    BUN 8 2015 0011    CREATININE 0 72 2020 0822    CREATININE 0 69 2015 0011        Component Value Date/Time    CALCIUM 10 1 2020 0822    CALCIUM 8 7 2015 0011    ALKPHOS 103 2020 0822    ALKPHOS 85 2015 0011    AST 18 2020 0822    AST 21 2015 0011    ALT 34 2020 3291 ALT 29 03/09/2015 0011    BILITOT 0 21 03/09/2015 0011            No results found for: CHOL  Lab Results   Component Value Date    HDL 37 (L) 11/18/2020    HDL 40 05/05/2017     Lab Results   Component Value Date    LDLCALC 140 (H) 11/18/2020    LDLCALC 140 (H) 05/05/2017     Lab Results   Component Value Date    TRIG 212 (H) 11/18/2020    TRIG 155 (H) 05/05/2017     No results found for: CHOLHDL    Imaging: No results found  ECG:    Sinus rhythm poor R-wave progression    ROS    Vitals:    08/04/21 1814   BP: 128/76   Pulse: 92   SpO2: 97%     Vitals:    08/04/21 1814   Weight: 96 9 kg (213 lb 11 2 oz)     Height: 5' 3" (160 cm)   Body mass index is 37 86 kg/m²  Physical Exam:  Vital signs reviewed  General:  Alert and cooperative, appears stated age, no acute distress  HEENT:  PERRLA, EOMI, no scleral icterus, no conjunctival pallor  Neck:  No lymphadenopathy, no thyromegaly, no carotid bruits, no elevated JVP  Heart:  Regular rate and rhythm, normal S1/S2, no S3/S4, no murmur, rubs or gallops  PMI nondisplaced  Lungs:  Clear to auscultation bilaterally, no wheezes rales or rhonchi  Abdomen:  Soft, non-tender, positive bowel sounds, no rebound or guarding,   no organomegaly   Extremities:  Normal range of motion    No clubbing, cyanosis or edema   Vascular:  2+ pedal pulses  Skin:  No rashes or lesions on exposed skin  Neurologic:  Cranial nerves II-XII grossly intact without focal deficits

## 2021-08-05 VITALS — WEIGHT: 212.7 LBS | BODY MASS INDEX: 37.68 KG/M2

## 2021-08-05 NOTE — PROGRESS NOTES
[facility]  Patient has surpassed her goal weight for surgery  Reviewed psychiatric report with patient; no contraindications for surgery  Patient may move forward with surgery  Reviewed workflow; cardio needs to be redone; patient needs lab work  Discussed patient quitting smoking; patient has not quit stating she wasn't going to quit until she knew she was having surgery; "Why would I quit if I'm not having surgery?"  Patient not wiling to commit to stop smoking  Explained patient needs to be nicotine free for 30 days prior to the nicotine test  Patient continues to push to have surgery "done as soon as possible " Explained at this point October would be the earliest it could be scheduled and she needs to quit smoking for her nicotine test  Patient scheduled with surgeon

## 2021-08-11 ENCOUNTER — OFFICE VISIT (OUTPATIENT)
Dept: BARIATRICS | Facility: CLINIC | Age: 41
End: 2021-08-11
Payer: COMMERCIAL

## 2021-08-11 VITALS
HEIGHT: 65 IN | BODY MASS INDEX: 35.4 KG/M2 | SYSTOLIC BLOOD PRESSURE: 110 MMHG | DIASTOLIC BLOOD PRESSURE: 78 MMHG | TEMPERATURE: 98 F | WEIGHT: 212.5 LBS | HEART RATE: 92 BPM

## 2021-08-11 DIAGNOSIS — E66.9 DIABETES MELLITUS TYPE 2 IN OBESE (HCC): ICD-10-CM

## 2021-08-11 DIAGNOSIS — E11.69 DIABETES MELLITUS TYPE 2 IN OBESE (HCC): ICD-10-CM

## 2021-08-11 DIAGNOSIS — I10 ESSENTIAL HYPERTENSION: ICD-10-CM

## 2021-08-11 DIAGNOSIS — E66.01 MORBID (SEVERE) OBESITY DUE TO EXCESS CALORIES (HCC): Primary | ICD-10-CM

## 2021-08-11 PROCEDURE — 99204 OFFICE O/P NEW MOD 45 MIN: CPT | Performed by: SURGERY

## 2021-08-11 RX ORDER — BENZONATATE 100 MG/1
1 CAPSULE ORAL EVERY 8 HOURS
COMMUNITY
Start: 2021-04-26

## 2021-08-11 RX ORDER — FLUCONAZOLE 150 MG/1
TABLET ORAL
COMMUNITY
Start: 2021-06-14

## 2021-08-11 RX ORDER — METHOCARBAMOL 500 MG/1
500 TABLET, FILM COATED ORAL 4 TIMES DAILY PRN
COMMUNITY
Start: 2021-03-14

## 2021-08-11 NOTE — H&P (VIEW-ONLY)
BARIATRIC CONSULT-INITIAL - BARIATRIC SURGERY  Jaycob Gonzalez 45 y o  female MRN: [id number]  Unit/Bed#:  Encounter: [id number]      HPI:  Jaycob Gonzalez is a 45 y o  female who presents with morbid obesity to discuss weight loss options  Review of Systems    Historical Information   Past Medical History:   Diagnosis Date    Asthma     Diabetes mellitus ([address] )     GERD (gastroesophageal reflux disease)     Hypertension     IBS (irritable bowel syndrome)     Kidney stones     Kidney stones     Psychiatric disorder     anxiety, depression, PTSD    PTSD (post-traumatic stress disorder)      Past Surgical History:   Procedure Laterality Date     SECTION  2018    TONSILECTOMY AND ADNOIDECTOMY      TONSILECTOMY AND ADNOIDECTOMY      WISDOM TOOTH EXTRACTION       Social History   Social History     Substance and Sexual Activity   Alcohol Use Yes    Comment: social     Social History     Substance and Sexual Activity   Drug Use No     Social History     Tobacco Use   Smoking Status Current Every Day Smoker    Packs/day: 0 25   Smokeless Tobacco Never Used     Family History: non-contributory    Meds/Allergies   all medications and allergies reviewed  Allergies   Allergen Reactions    Bee Venom Anaphylaxis    Sulfamethoxazole-Trimethoprim Anaphylaxis    Penicillins Hives       Objective       Current Vitals:   Blood Pressure: 110/78 (21 1306)  Pulse: 92 (21 1306)  Temperature: 98 °F (36 7 °C) (21 1306)  Temp Source: Tympanic (21 1306)  Height: 5' 4 5" (163 8 cm) (21 1306)  Weight - Scale: 96 4 kg (212 lb 8 oz) (21 1306)  Body mass index is 35 91 kg/m²  Invasive Devices     None                 Physical Exam    Lab Results: I have personally reviewed pertinent lab results  Imaging: I have personally reviewed pertinent reports  EKG, Pathology, and Other Studies: I have personally reviewed pertinent reports        Code Status: [unfilled]  Advance Directive and Living Will:      Power of :    POLST:      Assessment/PLAN:            Patient has a long history of morbid obesity and is presenting to discuss the surgical weight loss options  Despite the patient best efforts patient was unable to lose any meaningful or sustainable weight using nonsurgical means  We had a long discussion regarding all the surgical weight-loss options at our disposal at this point and reviewed the risks and benefits of each procedure in details as it relates to her age, BMI and medical conditions  Patient elected to undergo Sleeve   PATIENT NEEDS TO QUIT SMOKING HOWEVER DESPITE ME EXPLAINING TO HER IN DETAILS THE REASONS WHY WE REQUIRE PATIENTS TO QUIT SHE WAS VERY RESISTANT BUT EVENTUALLY SHE AGREED TO TRY TO QUIT TO BE ABLE TO UNDERGO THE PROCEDURE     Risks and benefits were explained to the patient  We also discussed the importance and need of a preoperative workup to make sure that the patient can undergo the procedure safely  Preoperative workup includes sleep apnea screening, cardiac evaluation, nutrition/psych and preoperative EGD  Risks and benefits of all the preoperative diagnostic tests were discussed with the patient including but not limited to the upper endoscopy  Alternatives to surgery and alternative forms of surgery were also explained  Postsurgical commitment and aftercare programs were discussed and explained to the patient in details       In terms of comorbidities patient suffers mostly of   Past Medical History:   Diagnosis Date    Asthma     Diabetes mellitus ([address] )     GERD (gastroesophageal reflux disease)     Hypertension     IBS (irritable bowel syndrome)     Kidney stones     Kidney stones     Psychiatric disorder     anxiety, depression, PTSD    PTSD (post-traumatic stress disorder)        I informed the patient that the rate of resolution of comorbid conditions following weight loss surgery is between 60 and 90% depending on the severity of the specific medical condition  I discussed and educated the patient regarding the different components of our multidisciplinary program and the importance of compliance and follow-up in the postoperative period  All questions answered  Patient understands risks and benefits  A videotape of the procedure was also shown to the patient  After showing the video we discussed all the technical aspects of the procedure and also the potential complications including but not limited to gastrointestinal perforation, leak, obstruction, stricture and hemorrhage  I spent 45 min with the patient more than 50% of the time was spent educating the patient and coordinating care

## 2021-08-16 ENCOUNTER — PREP FOR PROCEDURE (OUTPATIENT)
Dept: BARIATRICS | Facility: CLINIC | Age: 41
End: 2021-08-16

## 2021-08-16 DIAGNOSIS — Z98.84 BARIATRIC SURGERY STATUS: Primary | ICD-10-CM

## 2021-08-31 ENCOUNTER — TELEPHONE (OUTPATIENT)
Dept: GASTROENTEROLOGY | Facility: HOSPITAL | Age: 41
End: 2021-08-31

## 2021-08-31 ENCOUNTER — TELEPHONE (OUTPATIENT)
Dept: BARIATRICS | Facility: CLINIC | Age: 41
End: 2021-08-31

## 2021-08-31 NOTE — TELEPHONE ENCOUNTER
Called pt to remind her that preop bloodwork orders are in her chart from 8/3/21  Notified pt that patients will often get bloodwork drawn same day of EGD as they are already at the hospital and fasting at that time  Notified pt that there is reception desk inside hospital entrance from parking garage whom can direct her where to go for labs  Discussed that pt must be nicotine free for 30 days prior to negative nicotine test   Pt states she is currently 15 days nicotine free  Instructed pt that nicotine order is also in her chart but she can tell lab that she is going to hold off on having that test drawn for a few more weeks yet  Pt the asked when can she schedule her surgery  Instructed pt to discuss scheduling with her

## 2021-09-01 ENCOUNTER — APPOINTMENT (OUTPATIENT)
Dept: LAB | Facility: HOSPITAL | Age: 41
End: 2021-09-01
Attending: SURGERY
Payer: COMMERCIAL

## 2021-09-01 ENCOUNTER — HOSPITAL ENCOUNTER (OUTPATIENT)
Dept: GASTROENTEROLOGY | Facility: HOSPITAL | Age: 41
Setting detail: OUTPATIENT SURGERY
Discharge: HOME | End: 2021-09-01
Attending: SURGERY
Payer: COMMERCIAL

## 2021-09-01 ENCOUNTER — ANESTHESIA EVENT (OUTPATIENT)
Dept: GASTROENTEROLOGY | Facility: HOSPITAL | Age: 41
End: 2021-09-01

## 2021-09-01 ENCOUNTER — ANESTHESIA (OUTPATIENT)
Dept: GASTROENTEROLOGY | Facility: HOSPITAL | Age: 41
End: 2021-09-01

## 2021-09-01 VITALS
HEIGHT: 65 IN | BODY MASS INDEX: 35.32 KG/M2 | SYSTOLIC BLOOD PRESSURE: 115 MMHG | OXYGEN SATURATION: 95 % | HEART RATE: 87 BPM | RESPIRATION RATE: 20 BRPM | TEMPERATURE: 97.5 F | DIASTOLIC BLOOD PRESSURE: 67 MMHG | WEIGHT: 212 LBS

## 2021-09-01 DIAGNOSIS — E11.69 DIABETES MELLITUS TYPE 2 IN OBESE (HCC): ICD-10-CM

## 2021-09-01 DIAGNOSIS — Z72.0 TOBACCO ABUSE: ICD-10-CM

## 2021-09-01 DIAGNOSIS — Z72.0 NICOTINE ABUSE: ICD-10-CM

## 2021-09-01 DIAGNOSIS — E66.9 OBESITY, CLASS II, BMI 35-39.9: ICD-10-CM

## 2021-09-01 DIAGNOSIS — Z01.812 BLOOD TESTS PRIOR TO TREATMENT OR PROCEDURE: ICD-10-CM

## 2021-09-01 DIAGNOSIS — Z01.818 PREOPERATIVE CLEARANCE: ICD-10-CM

## 2021-09-01 DIAGNOSIS — Z98.84 BARIATRIC SURGERY STATUS: ICD-10-CM

## 2021-09-01 DIAGNOSIS — E66.9 DIABETES MELLITUS TYPE 2 IN OBESE (HCC): ICD-10-CM

## 2021-09-01 DIAGNOSIS — F17.200 SMOKING: ICD-10-CM

## 2021-09-01 LAB
ALBUMIN SERPL BCP-MCNC: 3.5 G/DL (ref 3.5–5)
ALP SERPL-CCNC: 99 U/L (ref 46–116)
ALT SERPL W P-5'-P-CCNC: 28 U/L (ref 12–78)
ANION GAP SERPL CALCULATED.3IONS-SCNC: 9 MMOL/L (ref 4–13)
AST SERPL W P-5'-P-CCNC: 18 U/L (ref 5–45)
BILIRUB SERPL-MCNC: 0.59 MG/DL (ref 0.2–1)
BUN SERPL-MCNC: 13 MG/DL (ref 5–25)
CALCIUM SERPL-MCNC: 8.3 MG/DL (ref 8.3–10.1)
CHLORIDE SERPL-SCNC: 101 MMOL/L (ref 100–108)
CHOLEST SERPL-MCNC: 213 MG/DL (ref 50–200)
CO2 SERPL-SCNC: 26 MMOL/L (ref 21–32)
CREAT SERPL-MCNC: 0.72 MG/DL (ref 0.6–1.3)
ERYTHROCYTE [DISTWIDTH] IN BLOOD BY AUTOMATED COUNT: 12.7 % (ref 11.6–15.1)
EST. AVERAGE GLUCOSE BLD GHB EST-MCNC: 246 MG/DL
EXT PREGNANCY TEST URINE: NEGATIVE
EXT. CONTROL: NORMAL
GFR SERPL CREATININE-BSD FRML MDRD: 105 ML/MIN/1.73SQ M
GLUCOSE P FAST SERPL-MCNC: 296 MG/DL (ref 65–99)
GLUCOSE SERPL-MCNC: 308 MG/DL (ref 65–140)
HBA1C MFR BLD: 10.2 %
HCT VFR BLD AUTO: 44 % (ref 34.8–46.1)
HDLC SERPL-MCNC: 39 MG/DL
HGB BLD-MCNC: 14.9 G/DL (ref 11.5–15.4)
LDLC SERPL CALC-MCNC: 126 MG/DL (ref 0–100)
MCH RBC QN AUTO: 30.8 PG (ref 26.8–34.3)
MCHC RBC AUTO-ENTMCNC: 33.9 G/DL (ref 31.4–37.4)
MCV RBC AUTO: 91 FL (ref 82–98)
NONHDLC SERPL-MCNC: 174 MG/DL
PLATELET # BLD AUTO: 161 THOUSANDS/UL (ref 149–390)
PMV BLD AUTO: 12.4 FL (ref 8.9–12.7)
POTASSIUM SERPL-SCNC: 4.5 MMOL/L (ref 3.5–5.3)
PROT SERPL-MCNC: 6.9 G/DL (ref 6.4–8.2)
RBC # BLD AUTO: 4.83 MILLION/UL (ref 3.81–5.12)
SODIUM SERPL-SCNC: 136 MMOL/L (ref 136–145)
TRIGL SERPL-MCNC: 239 MG/DL
TSH SERPL DL<=0.05 MIU/L-ACNC: 1.24 UIU/ML (ref 0.36–3.74)
WBC # BLD AUTO: 10.63 THOUSAND/UL (ref 4.31–10.16)

## 2021-09-01 PROCEDURE — 80061 LIPID PANEL: CPT

## 2021-09-01 PROCEDURE — 80053 COMPREHEN METABOLIC PANEL: CPT

## 2021-09-01 PROCEDURE — 88305 TISSUE EXAM BY PATHOLOGIST: CPT | Performed by: PATHOLOGY

## 2021-09-01 PROCEDURE — 82948 REAGENT STRIP/BLOOD GLUCOSE: CPT

## 2021-09-01 PROCEDURE — 80323 ALKALOIDS NOS: CPT

## 2021-09-01 PROCEDURE — 81025 URINE PREGNANCY TEST: CPT | Performed by: ANESTHESIOLOGY

## 2021-09-01 PROCEDURE — 84443 ASSAY THYROID STIM HORMONE: CPT

## 2021-09-01 PROCEDURE — 43239 EGD BIOPSY SINGLE/MULTIPLE: CPT | Performed by: SURGERY

## 2021-09-01 PROCEDURE — 36415 COLL VENOUS BLD VENIPUNCTURE: CPT

## 2021-09-01 PROCEDURE — 83036 HEMOGLOBIN GLYCOSYLATED A1C: CPT

## 2021-09-01 PROCEDURE — G0480 DRUG TEST DEF 1-7 CLASSES: HCPCS

## 2021-09-01 PROCEDURE — 85027 COMPLETE CBC AUTOMATED: CPT

## 2021-09-01 RX ORDER — LIDOCAINE HYDROCHLORIDE 20 MG/ML
INJECTION, SOLUTION EPIDURAL; INFILTRATION; INTRACAUDAL; PERINEURAL AS NEEDED
Status: DISCONTINUED | OUTPATIENT
Start: 2021-09-01 | End: 2021-09-01

## 2021-09-01 RX ORDER — SODIUM CHLORIDE 9 MG/ML
100 INJECTION, SOLUTION INTRAVENOUS CONTINUOUS
Status: DISCONTINUED | OUTPATIENT
Start: 2021-09-01 | End: 2021-09-05 | Stop reason: HOSPADM

## 2021-09-01 RX ORDER — PROPOFOL 10 MG/ML
INJECTION, EMULSION INTRAVENOUS AS NEEDED
Status: DISCONTINUED | OUTPATIENT
Start: 2021-09-01 | End: 2021-09-01

## 2021-09-01 RX ADMIN — PROPOFOL 50 MG: 10 INJECTION, EMULSION INTRAVENOUS at 09:20

## 2021-09-01 RX ADMIN — PROPOFOL 150 MG: 10 INJECTION, EMULSION INTRAVENOUS at 09:19

## 2021-09-01 RX ADMIN — SODIUM CHLORIDE 100 ML/HR: 0.9 INJECTION, SOLUTION INTRAVENOUS at 08:26

## 2021-09-01 RX ADMIN — LIDOCAINE HYDROCHLORIDE 100 MG: 20 INJECTION, SOLUTION EPIDURAL; INFILTRATION; INTRACAUDAL; PERINEURAL at 09:19

## 2021-09-01 NOTE — ANESTHESIA PREPROCEDURE EVALUATION
Procedure:  EGD    Relevant Problems   ENDO   (+) Diabetes mellitus type 2 in obese (HCC)      GI/HEPATIC   (+) Bariatric surgery status   (+) Rectal bleeding      NEURO/PSYCH   (+) Generalized anxiety disorder   (+) History of posttraumatic stress disorder (PTSD)   (+) Recurrent major depressive disorder, in remission ([facility] [address] )      PULMONARY   (+) Smoking        Physical Exam    Airway    Mallampati score: II  TM Distance: >3 FB  Neck ROM: full     Dental       Cardiovascular  Rhythm: regular, Rate: normal, Cardiovascular exam normal    Pulmonary  Pulmonary exam normal Breath sounds clear to auscultation,     Other Findings        Anesthesia Plan  ASA Score- 3     Anesthesia Type- general with ASA Monitors  Additional Monitors:   Airway Plan:           Plan Factors-Exercise tolerance (METS): >4 METS  Chart reviewed  Existing labs reviewed  Patient is a current smoker  Patient instructed to abstain from smoking on day of procedure  Patient did not smoke on day of surgery  Obstructive sleep apnea risk education given perioperatively  Induction- intravenous  Postoperative Plan-     Informed Consent- Anesthetic plan and risks discussed with patient

## 2021-09-01 NOTE — INTERVAL H&P NOTE
H&P reviewed  After examining the patient I find no changes in the patients condition since the H&P had been written      Vitals:    09/01/21 0817   BP: 133/79   Pulse: 79   Resp: 18   Temp: 97 5 °F (36 4 °C)   SpO2: 97%

## 2021-09-07 LAB
COTININE SERPL-MCNC: <1 NG/ML
NICOTINE SERPL-MCNC: <1 NG/ML

## 2021-09-09 ENCOUNTER — TELEPHONE (OUTPATIENT)
Dept: BARIATRICS | Facility: CLINIC | Age: 41
End: 2021-09-09

## 2021-09-09 NOTE — TELEPHONE ENCOUNTER
Dr Caden Osborn called and spoke with patient while I was in the room  to witness the conversation  He spoke with her about her A1C being 10 2 and that she will need to work with either an endocrinologist or her PCP to get her A1C under control and below 9 to have surgery  Patient then stated she is diabetic and that is why she wants surgery  Dr Caden Osborn explained by having a high A1C the infection rate increases and she could possibly  develop problems healing after surgery  He discussed medication with patient and she stated her PCP told her she need to begin insulin and she said she told him F you and she changed providers  Patient stated she did not care she is willing to take that chance  Dr Caden Osborn stated that he will not operate on her for her own safety  Patient then became abusive with Dr Carlos Wyatt using the F word many time she would not allow Dr Caden Osborn to even speak  Dr Caden Osborn asked patient to stop cursing and yelling at him he is trying to help her  Patient kept saying she wants to see documentation about requiring V3G to be at a certain level for surgery that she was going to call her insurance to see if this is correct  Dr Sarahy Disla explained her case cannot even be submitted for an approval with her A1C that high and uncontrolled her case would be denied  Again patient was verbally abusive using the F word  Patient was stating she does not have the money to see all these specialist and  have another EGD done  Dr Caden Osborn explained she will not need to repeat her EGD and he offered to contact her PCP to discuss her A1C  Patient again started yelling saying she completed everything and now she is being told she will need to wait months  She would not let Dr Caden Osborn speak he again asked her to stop cursing and let him speak   He explained to her he is not risking her health by performing her surgery at this point and if she did not want to follow our guidelines she was more than welcomed to have surgery with another surgeon but he is trying to do what is best for her  Patient then hung up

## 2021-09-12 ENCOUNTER — PATIENT MESSAGE (OUTPATIENT)
Dept: BARIATRICS | Facility: CLINIC | Age: 41
End: 2021-09-12

## 2021-12-03 ENCOUNTER — APPOINTMENT (OUTPATIENT)
Dept: LAB | Facility: HOSPITAL | Age: 41
End: 2021-12-03
Payer: COMMERCIAL

## 2021-12-03 DIAGNOSIS — E11.65 UNCONTROLLED TYPE 2 DIABETES MELLITUS WITH HYPERGLYCEMIA (HCC): ICD-10-CM

## 2021-12-03 LAB
EST. AVERAGE GLUCOSE BLD GHB EST-MCNC: 186 MG/DL
HBA1C MFR BLD: 8.1 %

## 2021-12-03 PROCEDURE — 83036 HEMOGLOBIN GLYCOSYLATED A1C: CPT

## 2021-12-03 PROCEDURE — 36415 COLL VENOUS BLD VENIPUNCTURE: CPT
